# Patient Record
Sex: FEMALE | Race: BLACK OR AFRICAN AMERICAN | Employment: FULL TIME | URBAN - METROPOLITAN AREA
[De-identification: names, ages, dates, MRNs, and addresses within clinical notes are randomized per-mention and may not be internally consistent; named-entity substitution may affect disease eponyms.]

---

## 2022-07-12 ENCOUNTER — OFFICE VISIT (OUTPATIENT)
Dept: FAMILY MEDICINE CLINIC | Age: 67
End: 2022-07-12
Payer: COMMERCIAL

## 2022-07-12 VITALS
DIASTOLIC BLOOD PRESSURE: 71 MMHG | SYSTOLIC BLOOD PRESSURE: 106 MMHG | BODY MASS INDEX: 33.31 KG/M2 | HEIGHT: 64 IN | WEIGHT: 195.13 LBS | HEART RATE: 90 BPM

## 2022-07-12 DIAGNOSIS — M25.572 ACUTE BILATERAL ANKLE PAIN: ICD-10-CM

## 2022-07-12 DIAGNOSIS — M25.571 ACUTE BILATERAL ANKLE PAIN: ICD-10-CM

## 2022-07-12 DIAGNOSIS — M79.672 FOOT PAIN, BILATERAL: Primary | ICD-10-CM

## 2022-07-12 DIAGNOSIS — M79.671 FOOT PAIN, BILATERAL: Primary | ICD-10-CM

## 2022-07-12 PROCEDURE — 1123F ACP DISCUSS/DSCN MKR DOCD: CPT | Performed by: FAMILY MEDICINE

## 2022-07-12 PROCEDURE — 99203 OFFICE O/P NEW LOW 30 MIN: CPT | Performed by: FAMILY MEDICINE

## 2022-07-12 SDOH — ECONOMIC STABILITY: FOOD INSECURITY: WITHIN THE PAST 12 MONTHS, YOU WORRIED THAT YOUR FOOD WOULD RUN OUT BEFORE YOU GOT MONEY TO BUY MORE.: NEVER TRUE

## 2022-07-12 SDOH — ECONOMIC STABILITY: FOOD INSECURITY: WITHIN THE PAST 12 MONTHS, THE FOOD YOU BOUGHT JUST DIDN'T LAST AND YOU DIDN'T HAVE MONEY TO GET MORE.: NEVER TRUE

## 2022-07-12 ASSESSMENT — ENCOUNTER SYMPTOMS
BACK PAIN: 0
RESPIRATORY NEGATIVE: 1
GASTROINTESTINAL NEGATIVE: 1

## 2022-07-12 ASSESSMENT — PATIENT HEALTH QUESTIONNAIRE - PHQ9
1. LITTLE INTEREST OR PLEASURE IN DOING THINGS: 0
SUM OF ALL RESPONSES TO PHQ QUESTIONS 1-9: 0
SUM OF ALL RESPONSES TO PHQ9 QUESTIONS 1 & 2: 0
SUM OF ALL RESPONSES TO PHQ QUESTIONS 1-9: 0
SUM OF ALL RESPONSES TO PHQ QUESTIONS 1-9: 0
2. FEELING DOWN, DEPRESSED OR HOPELESS: 0
SUM OF ALL RESPONSES TO PHQ QUESTIONS 1-9: 0

## 2022-07-12 ASSESSMENT — SOCIAL DETERMINANTS OF HEALTH (SDOH): HOW HARD IS IT FOR YOU TO PAY FOR THE VERY BASICS LIKE FOOD, HOUSING, MEDICAL CARE, AND HEATING?: NOT HARD AT ALL

## 2022-07-12 NOTE — LETTER
[address]  Phone: [phone]  Fax: [phone]    Linh Choudhury MD        July 12, 2022     Patient: Yasisne Hebetr   YOB: 1955   Date of Visit: 7/12/2022       To Whom It May Concern: It is my medical opinion that Yassine Hebert should remain out of work until 07/19/22. If you have any questions or concerns, please don't hesitate to call.     Sincerely,        Linh Choudhury MD
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
## 2022-07-14 ENCOUNTER — HOSPITAL ENCOUNTER (OUTPATIENT)
Dept: GENERAL RADIOLOGY | Age: 67
Discharge: HOME | End: 2022-07-14
Payer: COMMERCIAL

## 2022-07-14 ENCOUNTER — HOSPITAL ENCOUNTER (OUTPATIENT)
Age: 67
Discharge: HOME | End: 2022-07-14
Payer: COMMERCIAL

## 2022-07-14 DIAGNOSIS — M79.672 FOOT PAIN, BILATERAL: ICD-10-CM

## 2022-07-14 DIAGNOSIS — M25.572 ACUTE BILATERAL ANKLE PAIN: ICD-10-CM

## 2022-07-14 DIAGNOSIS — M25.571 ACUTE BILATERAL ANKLE PAIN: ICD-10-CM

## 2022-07-14 DIAGNOSIS — M79.671 FOOT PAIN, BILATERAL: ICD-10-CM

## 2022-07-14 PROCEDURE — 73620 X-RAY EXAM OF FOOT: CPT

## 2022-07-14 PROCEDURE — 73600 X-RAY EXAM OF ANKLE: CPT

## 2022-07-15 ENCOUNTER — TELEPHONE (OUTPATIENT)
Dept: FAMILY MEDICINE CLINIC | Age: 67
End: 2022-07-15

## 2022-07-19 ENCOUNTER — OFFICE VISIT (OUTPATIENT)
Dept: FAMILY MEDICINE CLINIC | Age: 67
End: 2022-07-19
Payer: COMMERCIAL

## 2022-07-19 VITALS
OXYGEN SATURATION: 97 % | DIASTOLIC BLOOD PRESSURE: 82 MMHG | BODY MASS INDEX: 33.46 KG/M2 | HEART RATE: 88 BPM | WEIGHT: 196 LBS | SYSTOLIC BLOOD PRESSURE: 126 MMHG | HEIGHT: 64 IN

## 2022-07-19 DIAGNOSIS — M79.631 RIGHT FOREARM PAIN: ICD-10-CM

## 2022-07-19 DIAGNOSIS — M79.671 FOOT PAIN, BILATERAL: ICD-10-CM

## 2022-07-19 DIAGNOSIS — M79.672 FOOT PAIN, BILATERAL: ICD-10-CM

## 2022-07-19 DIAGNOSIS — M89.8X2 PAIN OF RIGHT HUMERUS: Primary | ICD-10-CM

## 2022-07-19 DIAGNOSIS — M25.522 LEFT ELBOW PAIN: ICD-10-CM

## 2022-07-19 PROCEDURE — 1123F ACP DISCUSS/DSCN MKR DOCD: CPT | Performed by: NURSE PRACTITIONER

## 2022-07-19 PROCEDURE — 99213 OFFICE O/P EST LOW 20 MIN: CPT | Performed by: NURSE PRACTITIONER

## 2022-07-19 ASSESSMENT — ENCOUNTER SYMPTOMS
GASTROINTESTINAL NEGATIVE: 1
RESPIRATORY NEGATIVE: 1
BACK PAIN: 0

## 2022-07-19 NOTE — LETTER
[address]  Phone: [phone]  Fax: [phone]    BILL Reeves CNP        July 19, 2022     Patient: Kaci Todd   YOB: 1955   Date of Visit: 7/19/2022       To Whom It May Concern: It is my medical opinion that Kaci Todd should remain out of work until 8/1/2022. If you have any questions or concerns, please don't hesitate to call.     Sincerely,        BILL Reeves CNP
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
## 2022-07-19 NOTE — PATIENT INSTRUCTIONS
[address] [facility]  [address], [facility], [address]  Call to schedule: [phone]

## 2022-07-25 ENCOUNTER — HOSPITAL ENCOUNTER (OUTPATIENT)
Dept: PHYSICAL THERAPY | Age: 67
Setting detail: THERAPIES SERIES
Discharge: HOME | End: 2022-07-25
Payer: OTHER MISCELLANEOUS

## 2022-07-25 ENCOUNTER — HOSPITAL ENCOUNTER (OUTPATIENT)
Age: 67
Discharge: HOME | End: 2022-07-25
Payer: OTHER MISCELLANEOUS

## 2022-07-25 ENCOUNTER — HOSPITAL ENCOUNTER (OUTPATIENT)
Dept: GENERAL RADIOLOGY | Age: 67
Discharge: HOME | End: 2022-07-25
Payer: OTHER MISCELLANEOUS

## 2022-07-25 DIAGNOSIS — M89.8X2 PAIN OF RIGHT HUMERUS: ICD-10-CM

## 2022-07-25 DIAGNOSIS — M79.631 RIGHT FOREARM PAIN: ICD-10-CM

## 2022-07-25 DIAGNOSIS — M25.522 LEFT ELBOW PAIN: ICD-10-CM

## 2022-07-25 PROCEDURE — 97110 THERAPEUTIC EXERCISES: CPT

## 2022-07-25 PROCEDURE — 73070 X-RAY EXAM OF ELBOW: CPT

## 2022-07-25 PROCEDURE — 97161 PT EVAL LOW COMPLEX 20 MIN: CPT

## 2022-07-25 PROCEDURE — 73090 X-RAY EXAM OF FOREARM: CPT

## 2022-07-25 PROCEDURE — 73060 X-RAY EXAM OF HUMERUS: CPT

## 2022-07-25 NOTE — FLOWSHEET NOTE
[address] [facility]  Phone: [phone]   Fax: [phone]    Physical Therapy Daily Treatment Note  Date:  2022    Patient Name:  Rossana Hawkins    :  1955  MRN: [id number]  Medical/Treatment Diagnosis Information:  Diagnosis: M79.671, M79.672 (ICD-10-CM) - Foot pain, bilateral  M25.571, M25.572 (ICD-10-CM) - Acute bilateral ankle pain  Treatment Diagnosis: bilateral ankle and UE pain, decreased trunk control, decreased UE strength and ROM  Insurance/Certification information:  PT Insurance Information: farmer's insurance  Physician Information:   Gavi Asher [address] [place] [address] signed (Y/N): []  Yes [x]  No     Date of Patient follow up with Physician:      Progress Report: []  Yes  [x]  No     Date Range for reporting period:  Beginnin/25  Ending:     Progress report due (10 Rx/or 30 days whichever is less): visit #10 or 3/42 (date)     Recertification due (POC duration/ or 90 days whichever is less): visit #12 or 10/25 (date)     Visit # Insurance Allowable Auth required?  Date Range   1+  []  Yes  [x]  No      Latex Allergy:  [x]NO      []YES  Preferred Language for Healthcare:   [x]English       []other:    Functional Scale:           Date assessed:  FOTO physical FS primary measure score = 67; risk adjusted = 56      Pain level: 3/10 (RLE, RUE),  1/10 (LLE, LUE)     SUBJECTIVE:  See eval    OBJECTIVE: See eval      RESTRICTIONS/PRECAUTIONS: NA    Exercises/Interventions:     Therapeutic Exercises (53333) Resistance / level Sets/sec Reps Notes                                                                  Therapeutic Activities (37792)                                          Neuromuscular Re-ed (08129)                                                 Manual Intervention (77374)                                                   AquaticTherapy Dates of Service:   Aquatic Visits Exercises/Activities:   Transfers:  [] Stairs   [] Exercise Program:  HEP instruction: Written HEP instructions provided and reviewed  Access Code: [email or web address]  URL: [email or web address]  Date: 07/25/2022  Prepared by: Paty Chapman     Exercises  Gastroc Stretch on Wall - 1 x daily - 7 x weekly - 1 sets - 10 reps - 30 sec hold  Standing Gastroc Stretch on Step - 1 x daily - 7 x weekly - 1 sets - 10 reps - 30 sec hold  Seated Hamstring Stretch - 1 x daily - 7 x weekly - 1 sets - 10 reps - 30 sec hold  Quadricep Stretch with Chair and Counter Support - 1 x daily - 7 x weekly - 1 sets - 10 reps - 30 sec hold  Seated Wrist Flexion Stretch - 1 x daily - 7 x weekly - 1 sets - 10 reps - 30 sec hold  Seated Wrist Extension Stretch - 1 x daily - 7 x weekly - 1 sets - 10 reps - 30 sec hold    Aquatics   [] (66504) Aquatic therapy with therapeutic exercise. Provided verbal and tactile cueing for activities related to strengthening, flexibility, endurance, ROM for improvements in  [] LE / lumbar: LE, proximal hip, and core control in self care, mobility, lifting, ambulation and eccentric single leg control. [] UE / cervical: cervical, scapular, scapulothoracic and UE control with self care, reaching, carrying, lifting, house/yardwork, driving, computer work.   [] (07352) Therapist is in constant attendance of 2 or more patients providing skilled therapy interventions, but not providing any significant amount of measurable one-on-one time to either patient, for improvements in  [] LE / lumbar: LE, proximal hip, and core control in self care, mobility, lifting, ambulation and eccentric single leg control. [] UE / cervical: cervical, scapular, scapulothoracic and UE control with self care, reaching, carrying, lifting, house/yardwork, driving, computer work.       Therapeutic Exercise and NMR EXR  [] (70291) Provided verbal/tactile cueing for activities related to strengthening, flexibility, endurance, ROM for improvements in  [] LE / Lumbar: LE, proximal hip, and core control with self care, mobility, lifting, ambulation. [] UE / Cervical: cervical, postural, scapular, scapulothoracic and UE control with self care, reaching, carrying, lifting, house/yardwork, driving, computer work.  [] (13601) Provided verbal/tactile cueing for activities related to improving balance, coordination, kinesthetic sense, posture, motor skill, proprioception to assist with   [] LE / lumbar: LE, proximal hip, and core control in self care, mobility, lifting, ambulation and eccentric single leg control. [] UE / cervical: cervical, scapular, scapulothoracic and UE control with self care, reaching, carrying, lifting, house/yardwork, driving, computer work.   [] (25224) Therapist is in constant attendance of 2 or more patients providing skilled therapy interventions, but not providing any significant amount of measurable one-on-one time to either patient, for improvements in  [] LE / lumbar: LE, proximal hip, and core control in self care, mobility, lifting, ambulation and eccentric single leg control. [] UE / cervical: cervical, scapular, scapulothoracic and UE control with self care, reaching, carrying, lifting, house/yardwork, driving, computer work.      NMR and Therapeutic Activities:    [] (65688 or 80263) Provided verbal/tactile cueing for activities related to improving balance, coordination, kinesthetic sense, posture, motor skill, proprioception and motor activation to allow for proper function of   [] LE: / Lumbar core, proximal hip and LE with self care and ADLs  [] UE / Cervical: cervical, postural, scapular, scapulothoracic and UE control with self care, carrying, lifting, driving, computer work.   [] (85528) Gait Re-education- Provided training and instruction to the patient for proper LE, core and proximal hip recruitment and positioning and eccentric body weight control with ambulation re-education including up and down stairs     Home Management Training / Self Care:  [] (51784) Provided self-care/home management training related to activities of daily living and compensatory training, and/or use of adaptive equipment for improvement with: ADLs and compensatory training, meal preparation, safety procedures and instruction in use of adaptive equipment, including bathing, grooming, dressing, personal hygiene, basic household cleaning and chores. Home Exercise Program:    [x] (33344) Reviewed/Progressed HEP activities related to strengthening, flexibility, endurance, ROM of   [x] LE / Lumbar: core, proximal hip and LE for functional self-care, mobility, lifting and ambulation/stair navigation   [x] UE / Cervical: cervical, postural, scapular, scapulothoracic and UE control with self care, reaching, carrying, lifting, house/yardwork, driving, computer work  [] (93139)Reviewed/Progressed HEP activities related to improving balance, coordination, kinesthetic sense, posture, motor skill, proprioception of   [] LE: core, proximal hip and LE for self care, mobility, lifting, and ambulation/stair navigation    [] UE / Cervical: cervical, postural,  scapular, scapulothoracic and UE control with self care, reaching, carrying, lifting, house/yardwork, driving, computer work    Manual Treatments:  PROM / STM / Oscillations-Mobs:  G-I, II, III, IV (PA's, Inf., Post.)  [] (16140) Provided manual therapy to mobilize LE, proximal hip and/or LS spine soft tissue/joints for the purpose of modulating pain, promoting relaxation,  increasing ROM, reducing/eliminating soft tissue swelling/inflammation/restriction, improving soft tissue extensibility and allowing for proper ROM for normal function with   [] LE / lumbar: self care, mobility, lifting and ambulation. [] UE / Cervical: self care, reaching, carrying, lifting, house/yardwork, driving, computer work.      Modalities:  [] (64232) Vasopneumatic compression: Utilized vasopneumatic compression to decrease edema / swelling for the purpose of improving mobility and quad tone / recruitment which will allow for increased overall function including but not limited to self-care, transfers, ambulation, and ascending / descending stairs. Charges:  Timed Code Treatment Minutes: 20   Total Treatment Minutes: 40     [x] EVAL - LOW (84682)   [] EVAL - MOD (22522)  [] EVAL - HIGH (98736)  [] RE-EVAL (39601)  [x] ADITYA(53537) x       [] Ionto  [] NMR (68947) x       [] Vaso  [] Manual (85891) x       [] Ultrasound  [] TA x        [] Mech Traction (80379)  [] Aquatic Therapy x     [] ES (un) (99824):   [] Home Management Training x  [] ES(attended) (78223)   [] Dry Needling 1-2 muscles (32672):  [] Dry Needling 3+ muscles (351166)  [] Group:      [] Other:     GOALS:   Patient stated goal: To be able to walk and run again      Therapist goals for Patient:  Short Term Goals: To be achieved in: 2 weeks  1. Independent in HEP and progression per patient tolerance, in order to prevent re-injury. [] Progressing: [] Met: [] Not Met: [] Adjusted       2. Patient will have a decrease in pain to facilitate improvement in movement, function, and ADLs to allow decreased pain with walking. [] Progressing: [] Met: [] Not Met: [] Adjusted          Long Term Goals: To be achieved in: 6 weeks  1. FOTO score of at least 78 to assist with reaching prior level of function. [] Progressing: [] Met: [] Not Met: [] Adjusted       2. Patient will demonstrate increased AROM to [facility] to allow for proper joint functioning to allow decreased pain with housework. [] Progressing: [] Met: [] Not Met: [] Adjusted       3. Patient will demonstrate an increase in postural awareness and control to allow for proper functional mobility with housework, walking, running. [] Progressing: [] Met: [] Not Met: [] Adjusted       4. Patient will demonstrate an increase in Strength to at least 5/5 to allow for proper functional mobility as indicated by patients decreased core control, pain with housework.    [] Progressing: [] Met: [] Not Met: [] Adjusted       5. Patient will return to functional activities including walking without increased symptoms or restriction. [] Progressing: [] Met: [] Not Met: [] Adjusted       6. Patient will return to running without increased symptoms or restriction. [] Progressing: [] Met: [] Not Met: [] Adjusted         Overall Progression Towards Functional goals/ Treatment Progress Update:  [] Patient is progressing as expected towards functional goals listed. [] Progression is slowed due to complexities/Impairments listed. [] Progression has been slowed due to co-morbidities. [x] Plan just implemented, too soon to assess goals progression <30days   [] Goals require adjustment due to lack of progress  [] Patient is not progressing as expected and requires additional follow up with physician  [] Other    Persisting Functional Limitations/Impairments:  []Sleeping []Sitting               [x]Standing [x]Transfers        [x]Walking []Kneeling               [x]Stairs [x]Squatting / bending   []ADLs []Reaching  [x]Lifting  [x]Housework  []Driving []Job related tasks  [x]Sports/Recreation []Other:        ASSESSMENT:  See eval  Treatment/Activity Tolerance:  [x] Patient able to complete tx [] Patient limited by fatigue  [] Patient limited by pain  [] Patient limited by other medical complications  [] Other:     Prognosis: [x] Good [] Fair  [] Poor    Patient Requires Follow-up: [x] Yes  [] No    Plan for next treatment session: Aquatics     PLAN: See eval. PT 2x / week for 6 weeks. [] Continue per plan of care [] Alter current plan (see comments)  [x] Plan of care initiated [] Hold pending MD visit [] Discharge    Electronically signed by: Herberth Ny, PT PT, DPT  Frank Simmonds, SPT   Therapist was present, directed the patient's care, made skilled judgement, and was responsible for assessment and treatment of the patient.          Note: If patient does not return for scheduled/ recommended follow up visits, this note will serve as a discharge from care along with most recent update on progress.
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
## 2022-07-27 ENCOUNTER — TELEPHONE (OUTPATIENT)
Dept: FAMILY MEDICINE CLINIC | Age: 67
End: 2022-07-27

## 2022-07-27 NOTE — TELEPHONE ENCOUNTER
We can try to have them filled out for her if she has dropped them off at the office and she has all of her parts filled out.

## 2022-07-27 NOTE — TELEPHONE ENCOUNTER
Patient came in and dropped off a letter that needs to be revised. Attached is a note that the letter needs to be dated 6 weeks from this week. Please advise. The previous letter has been attached with her schedule.  Please advise

## 2022-07-27 NOTE — TELEPHONE ENCOUNTER
Patient called wanting to drop off paperwork for physical therapy that she wanted to have extended by regan.      Will come by in the office to drop it off

## 2022-07-28 NOTE — TELEPHONE ENCOUNTER
I do not see any letter or attachments. Show me what she wants however I cannot keep her off work for 6 weeks from this week. Since she last saw Dipesh Selby, this may have to wait until she returns on Monday. See me before calling the patient.

## 2022-07-29 NOTE — TELEPHONE ENCOUNTER
The patient has arthritic changes, nothing acute. My letter can not be changed, I would recommend she return to work on 8/1/22. If she needs to be seen, she can schedule for next available, but please let her know that I will not recommend for any further time off.

## 2022-07-29 NOTE — TELEPHONE ENCOUNTER
Mirtha Antonio called back and was given the message. Mirtha Antonio would like to see Rhiannon Patiño for an appt Tues. 8/2 in the morning. - advise.

## 2022-08-02 ENCOUNTER — HOSPITAL ENCOUNTER (OUTPATIENT)
Dept: PHYSICAL THERAPY | Age: 67
Setting detail: THERAPIES SERIES
Discharge: HOME | End: 2022-08-02
Payer: OTHER MISCELLANEOUS

## 2022-08-02 PROCEDURE — 97113 AQUATIC THERAPY/EXERCISES: CPT

## 2022-08-02 NOTE — FLOWSHEET NOTE
[facility]  Phone: [phone]   Fax: [phone]    Physical Therapy Daily Treatment Note  Date:  2022    Patient Name:  Franklin Laurent    :  1955  MRN: [id number]  Medical/Treatment Diagnosis Information:  Diagnosis: M79.671, M79.672 (ICD-10-CM) - Foot pain, bilateral  M25.571, M25.572 (ICD-10-CM) - Acute bilateral ankle pain  Treatment Diagnosis: bilateral ankle and UE pain, decreased trunk control, decreased UE strength and ROM  Insurance/Certification information:   PT Insurance Information: farmer's insurance  Physician Information:   Anny Garcia, [address] [place] [address] signed (Y/N): []  Yes [x]  No -sent on     Date of Patient follow up with Physician:      Progress Report: []  Yes  [x]  No     Date Range for reporting period:  Beginnin/25  Ending:     Progress report due (10 Rx/or 30 days whichever is less): visit #10 or  (date)     Recertification due (POC duration/ or 90 days whichever is less): visit # or 10/25 (date)     Visit # Insurance Allowable Auth required? Date Range   +  []  Yes  [x]  No      Latex Allergy:  [x]NO      []YES  Preferred Language for Healthcare:   [x]English       []other:    Functional Scale:           Date assessed:  FOTO physical FS primary measure score = 67; risk adjusted = 56      Pain level: 3/10 (RLE, RUE),  1/10 (LLE, LUE)     SUBJECTIVE:  Pt extremely fearful of water. Hoping to not walk far in water.      OBJECTIVE: See eval      RESTRICTIONS/PRECAUTIONS: NA    Exercises/Interventions:     Therapeutic Exercises (66320) Resistance / level Sets/sec Reps Notes                                                                  Therapeutic Activities (21558)                                          Neuromuscular Re-ed (93972)                                                 Manual Intervention (24794)                                                   AquaticTherapy Dates of Service: Aquatic Visits Exercises/Activities: *wears life jacket and requires assistance due to extreme fear of water*   Transfers:  [x] Stairs   [x] Ramp  [] Chair Lift   % Immersion:            Ambulation/ Warm up:   UE Exercises: Forward  x1 Shoulder Shrugs  x    Lateral   X 5 mini laps along wall  Shoulder Circles  x    Retro   X 5 mini laps along wall Scapular Retraction   x   [place]    Push Downs      Heel/Toe Walking    Punching       Rowing       Elbow Flex/Ext       Shldr Flex/Ext x      [employer] aBd/aDd x   LE Exercises:  Shldr Horiz aBd/aDd x   HR/TR x10 Shldr IR/ER    Marches x10 Arm Circles x   Squats x10 PNF Diagonals    Hamstring Curls x10 Wall Push Ups    Hip Flexion (SLR) x10     Hip aBduction (SLR) x10     Hip Extension (SLR) x10      Hip aDduction (SLR)      Hip Circles x10 Functional:    Hip IR/Er  Step up forward x   Hip Hikes  Step up lateral  x     Step down        Lunges Forward      Lunges Retro      Lunges Lateral     Balance:        SLS  x      Tandem Stance       NBOS eyes open  Seated:     NBOS eyes closed  Ankle pumps     Hand to Opposite Knee x Ankle Circles     Fwd Step ups to SLS  Knee Flex/Ext    Lateral Step ups to SLS  Hip aBd/aDd    Stop/Go Gait   Bicycle       Ankle DF/PF      Ankle Inv/Ev    Stretching:       Gastroc/Soleus x     Hamstring  x Deep Water:    Knee Flex Stretch  Jog    Piriformis   Noodle Hang    Hip Flexor  Traction at Wall X 5 min   SKTC      DKTC       ITB  Cool Down:    Quad  Fwd Walking x   Mid Back   Lat Walking    UT  Retro Walking    Post Shoulder  Noodle float    Ladder Pull x     Pec Stretch            Core: Other:    TrA set      Pelvic Tilts      Multifidi Walk outs c paddle      PNF Chop/Lifts                          Aquatic Abbreviation Key  B= Belt DB= Dumbells T= Theratube   H= Hydrotone N= Noodles W= Weights   P= Paddles S= Speedo equipment K= Kickboard      Pt.  Education: Gave pt tour of pool, explained how to use lockers, what to wear, that it would be in group setting, and showed pt aquatic equipment. Modalities:     Pt. Education:  -patient educated on diagnosis, prognosis and expectations for rehab  -all patient questions were answered    Home Exercise Program:  HEP instruction: Written HEP instructions provided and reviewed  Access Code: [email or web address]  URL: [email or web address]  Date: 07/25/2022  Prepared by: [facility]     Exercises  Gastroc Stretch on Wall - 1 x daily - 7 x weekly - 1 sets - 10 reps - 30 sec hold  Standing Gastroc Stretch on Step - 1 x daily - 7 x weekly - 1 sets - 10 reps - 30 sec hold  Seated Hamstring Stretch - 1 x daily - 7 x weekly - 1 sets - 10 reps - 30 sec hold  Quadricep Stretch with Chair and Counter Support - 1 x daily - 7 x weekly - 1 sets - 10 reps - 30 sec hold  Seated Wrist Flexion Stretch - 1 x daily - 7 x weekly - 1 sets - 10 reps - 30 sec hold  Seated Wrist Extension Stretch - 1 x daily - 7 x weekly - 1 sets - 10 reps - 30 sec hold    Aquatics   [x] (05057) Aquatic therapy with therapeutic exercise. Provided verbal and tactile cueing for activities related to strengthening, flexibility, endurance, ROM for improvements in  [x] LE / lumbar: LE, proximal hip, and core control in self care, mobility, lifting, ambulation and eccentric single leg control. [] UE / cervical: cervical, scapular, scapulothoracic and UE control with self care, reaching, carrying, lifting, house/yardwork, driving, computer work.   [] (61196) Therapist is in constant attendance of 2 or more patients providing skilled therapy interventions, but not providing any significant amount of measurable one-on-one time to either patient, for improvements in  [] LE / lumbar: LE, proximal hip, and core control in self care, mobility, lifting, ambulation and eccentric single leg control. [] UE / cervical: cervical, scapular, scapulothoracic and UE control with self care, reaching, carrying, lifting, house/yardwork, driving, computer work. Therapeutic Exercise and NMR EXR  [] (43379) Provided verbal/tactile cueing for activities related to strengthening, flexibility, endurance, ROM for improvements in  [] LE / Lumbar: LE, proximal hip, and core control with self care, mobility, lifting, ambulation. [] UE / Cervical: cervical, postural, scapular, scapulothoracic and UE control with self care, reaching, carrying, lifting, house/yardwork, driving, computer work.  [] (64986) Provided verbal/tactile cueing for activities related to improving balance, coordination, kinesthetic sense, posture, motor skill, proprioception to assist with   [] LE / lumbar: LE, proximal hip, and core control in self care, mobility, lifting, ambulation and eccentric single leg control. [] UE / cervical: cervical, scapular, scapulothoracic and UE control with self care, reaching, carrying, lifting, house/yardwork, driving, computer work.   [] (00674) Therapist is in constant attendance of 2 or more patients providing skilled therapy interventions, but not providing any significant amount of measurable one-on-one time to either patient, for improvements in  [] LE / lumbar: LE, proximal hip, and core control in self care, mobility, lifting, ambulation and eccentric single leg control. [] UE / cervical: cervical, scapular, scapulothoracic and UE control with self care, reaching, carrying, lifting, house/yardwork, driving, computer work.      NMR and Therapeutic Activities:    [] (91345 or 55339) Provided verbal/tactile cueing for activities related to improving balance, coordination, kinesthetic sense, posture, motor skill, proprioception and motor activation to allow for proper function of   [] LE: / Lumbar core, proximal hip and LE with self care and ADLs  [] UE / Cervical: cervical, postural, scapular, scapulothoracic and UE control with self care, carrying, lifting, driving, computer work.   [] (60068) Gait Re-education- Provided training and instruction to the patient for proper LE, core and proximal hip recruitment and positioning and eccentric body weight control with ambulation re-education including up and down stairs     Home Management Training / Self Care:  [] (72063) Provided self-care/home management training related to activities of daily living and compensatory training, and/or use of adaptive equipment for improvement with: ADLs and compensatory training, meal preparation, safety procedures and instruction in use of adaptive equipment, including bathing, grooming, dressing, personal hygiene, basic household cleaning and chores. Home Exercise Program:    [] (66739) Reviewed/Progressed HEP activities related to strengthening, flexibility, endurance, ROM of   [] LE / Lumbar: core, proximal hip and LE for functional self-care, mobility, lifting and ambulation/stair navigation   [] UE / Cervical: cervical, postural, scapular, scapulothoracic and UE control with self care, reaching, carrying, lifting, house/yardwork, driving, computer work  [] (46332)Reviewed/Progressed HEP activities related to improving balance, coordination, kinesthetic sense, posture, motor skill, proprioception of   [] LE: core, proximal hip and LE for self care, mobility, lifting, and ambulation/stair navigation    [] UE / Cervical: cervical, postural,  scapular, scapulothoracic and UE control with self care, reaching, carrying, lifting, house/yardwork, driving, computer work    Manual Treatments:  PROM / STM / Oscillations-Mobs:  G-I, II, III, IV (PA's, Inf., Post.)  [] (20043) Provided manual therapy to mobilize LE, proximal hip and/or LS spine soft tissue/joints for the purpose of modulating pain, promoting relaxation,  increasing ROM, reducing/eliminating soft tissue swelling/inflammation/restriction, improving soft tissue extensibility and allowing for proper ROM for normal function with   [] LE / lumbar: self care, mobility, lifting and ambulation.     [] UE / Cervical: self care, reaching, carrying, lifting, house/yardwork, driving, computer work. Modalities:  [] (82116) Vasopneumatic compression: Utilized vasopneumatic compression to decrease edema / swelling for the purpose of improving mobility and quad tone / recruitment which will allow for increased overall function including but not limited to self-care, transfers, ambulation, and ascending / descending stairs. Charges:  Timed Code Treatment Minutes: 30   Total Treatment Minutes: 30     [] EVAL - LOW (09800)   [] EVAL - MOD (68499)  [] EVAL - HIGH (31353)  [] RE-EVAL (21779)  [] BT(87271) x       [] Ionto  [] NMR (53737) x       [] Vaso  [] Manual (44818) x       [] Ultrasound  [] TA x        [] Mech Traction (51009)  [x] Aquatic Therapy x  2   [] ES (un) (52522):   [] Home Management Training x  [] ES(attended) (49446)   [] Dry Needling 1-2 muscles (96240):  [] Dry Needling 3+ muscles (159013)  [] Group:      [] Other:     GOALS:   Patient stated goal: To be able to walk and run again      Therapist goals for Patient:  Short Term Goals: To be achieved in: 2 weeks  1. Independent in HEP and progression per patient tolerance, in order to prevent re-injury. [] Progressing: [] Met: [] Not Met: [] Adjusted       2. Patient will have a decrease in pain to facilitate improvement in movement, function, and ADLs to allow decreased pain with walking. [] Progressing: [] Met: [] Not Met: [] Adjusted          Long Term Goals: To be achieved in: 6 weeks  1. FOTO score of at least 78 to assist with reaching prior level of function. [] Progressing: [] Met: [] Not Met: [] Adjusted       2. Patient will demonstrate increased AROM to [facility] to allow for proper joint functioning to allow decreased pain with housework. [] Progressing: [] Met: [] Not Met: [] Adjusted       3. Patient will demonstrate an increase in postural awareness and control to allow for proper functional mobility with housework, walking, running.    [] Progressing: [] Met: [] Not Met: [] Adjusted       4. Patient will demonstrate an increase in Strength to at least 5/5 to allow for proper functional mobility as indicated by patients decreased core control, pain with housework. [] Progressing: [] Met: [] Not Met: [] Adjusted       5. Patient will return to functional activities including walking without increased symptoms or restriction. [] Progressing: [] Met: [] Not Met: [] Adjusted       6. Patient will return to running without increased symptoms or restriction. [] Progressing: [] Met: [] Not Met: [] Adjusted         Overall Progression Towards Functional goals/ Treatment Progress Update:  [] Patient is progressing as expected towards functional goals listed. [] Progression is slowed due to complexities/Impairments listed. [] Progression has been slowed due to co-morbidities. [x] Plan just implemented, too soon to assess goals progression <30days   [] Goals require adjustment due to lack of progress  [] Patient is not progressing as expected and requires additional follow up with physician  [] Other    Persisting Functional Limitations/Impairments:  []Sleeping []Sitting               [x]Standing [x]Transfers        [x]Walking []Kneeling               [x]Stairs [x]Squatting / bending   []ADLs []Reaching  [x]Lifting  [x]Housework  []Driving []Job related tasks  [x]Sports/Recreation []Other:        ASSESSMENT:  Pt extremely fearful of water upon initial aquatic session today. Pt educated on walking along wall and wearing life jacket and holding noodle to feel more stable. Pt limited by fear of water, not balance. Tolerated all exercises well in water along wall with SBA. Ended with traction in corner of pool to relieve LBP. Continue to progress as tolerated, if Pt too fearful of water consider switching to land therapy.        Treatment/Activity Tolerance:  [x] Patient able to complete tx [] Patient limited by fatigue  [] Patient limited by pain  [] Patient limited by other medical

## 2022-08-04 ENCOUNTER — HOSPITAL ENCOUNTER (OUTPATIENT)
Dept: PHYSICAL THERAPY | Age: 67
Setting detail: THERAPIES SERIES
Discharge: HOME | End: 2022-08-04
Payer: OTHER MISCELLANEOUS

## 2022-08-04 NOTE — FLOWSHEET NOTE
Physical Therapy  Cancellation/No-show Note  Patient Name:  Yo Reyna  :  1955   Date:  2022  Cancelled visits to date: 1  No-shows to date: 0    Patient status for today's appointment patient:  [x]  Cancelled   []  Rescheduled appointment  []  No-show     Reason given by patient:  []  Patient ill  []  Conflicting appointment  []  No transportation    []  Conflict with work  []  No reason given  [x]  Other:     Comments:  Home emergency    Phone call information:   []  Phone call made today to patient at _ time at number provided:      []  Patient answered, conversation as follows:    []  Patient did not answer, message left as follows:  []  Phone call not made today  [x]  Phone call not needed - pt contacted us to cancel and provided reason for cancellation.      Electronically signed by:  Pao Verdin PT

## 2022-08-09 ENCOUNTER — HOSPITAL ENCOUNTER (OUTPATIENT)
Dept: PHYSICAL THERAPY | Age: 67
Setting detail: THERAPIES SERIES
Discharge: HOME | End: 2022-08-09
Payer: OTHER MISCELLANEOUS

## 2022-08-09 PROCEDURE — 97113 AQUATIC THERAPY/EXERCISES: CPT

## 2022-08-09 NOTE — FLOWSHEET NOTE
[address] [facility] Physical Therapy  Phone: [phone]   Fax: [phone]    Physical Therapy Daily Treatment Note  Date:  2022    Patient Name:  Hari Whipple    :  1955  MRN: [id number]  Medical/Treatment Diagnosis Information:  Diagnosis: M79.671, M79.672 (ICD-10-CM) - Foot pain, bilateral  M25.571, M25.572 (ICD-10-CM) - Acute bilateral ankle pain  Treatment Diagnosis: bilateral ankle and UE pain, decreased trunk control, decreased UE strength and ROM  Insurance/Certification information:   PT Insurance Information: farmer's insurance  Physician Information:   Nicki Sheth [address] [place] [address] signed (Y/N): []  Yes [x]  No -sent on     Date of Patient follow up with Physician:      Progress Report: []  Yes  [x]  No     Date Range for reporting period:  Beginnin/25  Ending:     Progress report due (10 Rx/or 30 days whichever is less): visit #10 or  (date)     Recertification due (POC duration/ or 90 days whichever is less): visit # or 10/25 (date)     Visit # Insurance Allowable Auth required? Date Range   +   []  Yes  [x]  No      Latex Allergy:  [x]NO      []YES  Preferred Language for Healthcare:   [x]English       []other:    Functional Scale:           Date assessed:  TO physical FS primary measure score = 67; risk adjusted = 56      Pain level:  5/10 -generally     SUBJECTIVE:  Pt reports she felt better after the last session and is feeling more comfortable getting in the water today.      OBJECTIVE: See eval      RESTRICTIONS/PRECAUTIONS: NA    Exercises/Interventions:     Therapeutic Exercises (06300) Resistance / level Sets/sec Reps Notes                                                                  Therapeutic Activities (12364)                                          Neuromuscular Re-ed (72672)                                                 Manual Intervention (75536) AquaticTherapy Dates of Service: 8/9  Aquatic Visits Exercises/Activities: *wears life jacket and requires assistance due to extreme fear of water*   Transfers:  [] Stairs   [x] Ramp  [] Chair Lift   % Immersion:            Ambulation/ Warm up:   UE Exercises: Forward  X 2 laps along railing of ramp with 2 noodles in other hand Shoulder Shrugs  x    Lateral   X 2 laps along railing of ramp with 2 noodles in other hand Shoulder Circles  x    Retro   X Scapular Retraction   x   [place]    Push Downs      Heel/Toe Walking    Punching       Rowing       Elbow Flex/Ext X 10      Shldr Flex/Ext X 10      Shldr aBd/aDd X 10   LE Exercises:  Shldr Horiz aBd/aDd x   HR/TR x10 Shldr IR/ER    Marches x10 Arm Circles X 10 cw/ccw   Squats x10 PNF Diagonals    Hamstring Curls x10 Wall Push Ups    Hip Flexion (SLR) x10     Hip aBduction (SLR) x10     Hip Extension (SLR) x10      Hip aDduction (SLR)      Hip Circles x10 Functional:    Hip IR/Er  Step up forward x   Hip Hikes  Step up lateral  x     Step down        Lunges Forward      Lunges Retro      Lunges Lateral     Balance:        SLS  x      Tandem Stance       NBOS eyes open  Seated:     NBOS eyes closed  Ankle pumps     Hand to Opposite Knee x Ankle Circles     Fwd Step ups to SLS  Knee Flex/Ext    Lateral Step ups to SLS  Hip aBd/aDd    Stop/Go Gait   Bicycle       Ankle DF/PF      Ankle Inv/Ev    Stretching:       Gastroc/Soleus x     Hamstring  x Deep Water:    Knee Flex Stretch  Jog    Piriformis   Noodle Hang    Hip Flexor  Traction at Wall X SKTC      DKTC       ITB  Cool Down:    Quad  Fwd Walking x   Mid Back   Lat Walking    UT  Retro Walking    Post Shoulder  Noodle float    Ladder Pull x     Pec Stretch            Core:   Other:    TrA set      Pelvic Tilts      Multifidi Walk outs c paddle      PNF Chop/Lifts                          Aquatic Abbreviation Key  B= Belt DB= Dumbells T= Theratube   H= Hydrotone N= Noodles W= Weights   P= Paddles S= Speedo equipment K= Kickboard      Pt. Education: Gave pt tour of pool, explained how to use lockers, what to wear, that it would be in group setting, and showed pt aquatic equipment. Modalities:     Pt. Education:  -patient educated on diagnosis, prognosis and expectations for rehab  -all patient questions were answered    Home Exercise Program:  HEP instruction: Written HEP instructions provided and reviewed  Access Code: [email or web address]  URL: [employer]/  Date: 07/25/2022  Prepared by: Isabel Hickey     Exercises  Gastroc Stretch on Wall - 1 x daily - 7 x weekly - 1 sets - 10 reps - 30 sec hold  Standing Gastroc Stretch on Step - 1 x daily - 7 x weekly - 1 sets - 10 reps - 30 sec hold  Seated Hamstring Stretch - 1 x daily - 7 x weekly - 1 sets - 10 reps - 30 sec hold  Quadricep Stretch with Chair and Counter Support - 1 x daily - 7 x weekly - 1 sets - 10 reps - 30 sec hold  Seated Wrist Flexion Stretch - 1 x daily - 7 x weekly - 1 sets - 10 reps - 30 sec hold  Seated Wrist Extension Stretch - 1 x daily - 7 x weekly - 1 sets - 10 reps - 30 sec hold    Aquatics   [x] (32981) Aquatic therapy with therapeutic exercise. Provided verbal and tactile cueing for activities related to strengthening, flexibility, endurance, ROM for improvements in  [x] LE / lumbar: LE, proximal hip, and core control in self care, mobility, lifting, ambulation and eccentric single leg control. [] UE / cervical: cervical, scapular, scapulothoracic and UE control with self care, reaching, carrying, lifting, house/yardwork, driving, computer work.   [] (42847) Therapist is in constant attendance of 2 or more patients providing skilled therapy interventions, but not providing any significant amount of measurable one-on-one time to either patient, for improvements in  [] LE / lumbar: LE, proximal hip, and core control in self care, mobility, lifting, ambulation and eccentric single leg control.    [] UE / cervical: cervical, scapular, scapulothoracic and UE control with self care, reaching, carrying, lifting, house/yardwork, driving, computer work. Therapeutic Exercise and NMR EXR  [] (54265) Provided verbal/tactile cueing for activities related to strengthening, flexibility, endurance, ROM for improvements in  [] LE / Lumbar: LE, proximal hip, and core control with self care, mobility, lifting, ambulation. [] UE / Cervical: cervical, postural, scapular, scapulothoracic and UE control with self care, reaching, carrying, lifting, house/yardwork, driving, computer work.  [] (17938) Provided verbal/tactile cueing for activities related to improving balance, coordination, kinesthetic sense, posture, motor skill, proprioception to assist with   [] LE / lumbar: LE, proximal hip, and core control in self care, mobility, lifting, ambulation and eccentric single leg control. [] UE / cervical: cervical, scapular, scapulothoracic and UE control with self care, reaching, carrying, lifting, house/yardwork, driving, computer work.   [] (67304) Therapist is in constant attendance of 2 or more patients providing skilled therapy interventions, but not providing any significant amount of measurable one-on-one time to either patient, for improvements in  [] LE / lumbar: LE, proximal hip, and core control in self care, mobility, lifting, ambulation and eccentric single leg control. [] UE / cervical: cervical, scapular, scapulothoracic and UE control with self care, reaching, carrying, lifting, house/yardwork, driving, computer work.      NMR and Therapeutic Activities:    [] (52533 or 98055) Provided verbal/tactile cueing for activities related to improving balance, coordination, kinesthetic sense, posture, motor skill, proprioception and motor activation to allow for proper function of   [] LE: / Lumbar core, proximal hip and LE with self care and ADLs  [] UE / Cervical: cervical, postural, scapular, scapulothoracic and UE control with self care, carrying, lifting, driving, computer work.   [] (65595) Gait Re-education- Provided training and instruction to the patient for proper LE, core and proximal hip recruitment and positioning and eccentric body weight control with ambulation re-education including up and down stairs     Home Management Training / Self Care:  [] (75450) Provided self-care/home management training related to activities of daily living and compensatory training, and/or use of adaptive equipment for improvement with: ADLs and compensatory training, meal preparation, safety procedures and instruction in use of adaptive equipment, including bathing, grooming, dressing, personal hygiene, basic household cleaning and chores.      Home Exercise Program:    [] (04427) Reviewed/Progressed HEP activities related to strengthening, flexibility, endurance, ROM of   [] LE / Lumbar: core, proximal hip and LE for functional self-care, mobility, lifting and ambulation/stair navigation   [] UE / Cervical: cervical, postural, scapular, scapulothoracic and UE control with self care, reaching, carrying, lifting, house/yardwork, driving, computer work  [] (00278)Reviewed/Progressed HEP activities related to improving balance, coordination, kinesthetic sense, posture, motor skill, proprioception of   [] LE: core, proximal hip and LE for self care, mobility, lifting, and ambulation/stair navigation    [] UE / Cervical: cervical, postural,  scapular, scapulothoracic and UE control with self care, reaching, carrying, lifting, house/yardwork, driving, computer work    Manual Treatments:  PROM / STM / Oscillations-Mobs:  G-I, II, III, IV (PA's, Inf., Post.)  [] (46913) Provided manual therapy to mobilize LE, proximal hip and/or LS spine soft tissue/joints for the purpose of modulating pain, promoting relaxation,  increasing ROM, reducing/eliminating soft tissue swelling/inflammation/restriction, improving soft tissue extensibility and allowing for proper ROM for normal control to allow for proper functional mobility with housework, walking, running. [] Progressing: [] Met: [] Not Met: [] Adjusted       4. Patient will demonstrate an increase in Strength to at least 5/5 to allow for proper functional mobility as indicated by patients decreased core control, pain with housework. [] Progressing: [] Met: [] Not Met: [] Adjusted       5. Patient will return to functional activities including walking without increased symptoms or restriction. [] Progressing: [] Met: [] Not Met: [] Adjusted       6. Patient will return to running without increased symptoms or restriction. [] Progressing: [] Met: [] Not Met: [] Adjusted         Overall Progression Towards Functional goals/ Treatment Progress Update:  [] Patient is progressing as expected towards functional goals listed. [] Progression is slowed due to complexities/Impairments listed. [] Progression has been slowed due to co-morbidities. [x] Plan just implemented, too soon to assess goals progression <30days   [] Goals require adjustment due to lack of progress  [] Patient is not progressing as expected and requires additional follow up with physician  [] Other    Persisting Functional Limitations/Impairments:  []Sleeping []Sitting               [x]Standing [x]Transfers        [x]Walking []Kneeling               [x]Stairs [x]Squatting / bending   []ADLs []Reaching  [x]Lifting  [x]Housework  []Driving []Job related tasks  [x]Sports/Recreation []Other:        ASSESSMENT:  Pt feeling much more comfortable in the pool this date. She wore the life jacket and used two pool noodles when not holding onto the wall. Kept reps at 10 this date and added UE exercises to see how pt responds next session. If pt is feeling more comfortable, my trial walking entire length of pool.      Treatment/Activity Tolerance:  [x] Patient able to complete tx [] Patient limited by fatigue  [] Patient limited by pain  [] Patient limited by other medical complications  [] Other:     Prognosis: [x] Good [] Fair  [] Poor    Patient Requires Follow-up: [x] Yes  [] No    Plan for next treatment session: Aquatics     PLAN: See eval. PT 2x / week for 6 weeks. [x] Continue per plan of care [] Alter current plan (see comments)  [] Plan of care initiated [] Hold pending MD visit [] Discharge    Electronically signed by: Dipesh Chacon, PT DPT         Note: If patient does not return for scheduled/ recommended follow up visits, this note will serve as a discharge from care along with most recent update on progress.

## 2022-08-11 ENCOUNTER — HOSPITAL ENCOUNTER (OUTPATIENT)
Dept: PHYSICAL THERAPY | Age: 67
Setting detail: THERAPIES SERIES
Discharge: HOME | End: 2022-08-11
Payer: OTHER MISCELLANEOUS

## 2022-08-11 PROCEDURE — 97113 AQUATIC THERAPY/EXERCISES: CPT

## 2022-08-11 NOTE — FLOWSHEET NOTE
[address] [facility]  Phone: [phone]   Fax: [phone]    Physical Therapy Daily Treatment Note  Date:  2022    Patient Name:  Laura Rosales    :  1955  MRN: [id number]  Medical/Treatment Diagnosis Information:  Diagnosis: M79.671, M79.672 (ICD-10-CM) - Foot pain, bilateral  M25.571, M25.572 (ICD-10-CM) - Acute bilateral ankle pain  Treatment Diagnosis: bilateral ankle and UE pain, decreased trunk control, decreased UE strength and ROM  Insurance/Certification information:   PT Insurance Information: farmer's insurance  Physician Information:   Mac Bosch [address] [place] [address] signed (Y/N): []  Yes [x]  No -sent on     Date of Patient follow up with Physician:      Progress Report: []  Yes  [x]  No     Date Range for reporting period:  Beginnin/25  Ending:     Progress report due (10 Rx/or 30 days whichever is less): visit #10 or  (date)     Recertification due (POC duration/ or 90 days whichever is less): visit # or 10/25 (date)     Visit # Insurance Allowable Auth required? Date Range   + 3/12  []  Yes  [x]  No      Latex Allergy:  [x]NO      []YES  Preferred Language for Healthcare:   [x]English       []other:    Functional Scale:           Date assessed:  TO physical FS primary measure score = 67; risk adjusted = 56      Pain level:  3/10 -generally     SUBJECTIVE:  Says both of the tops of her feet bother her. Felt better in the water last time.      OBJECTIVE: See eval      RESTRICTIONS/PRECAUTIONS: NA    Exercises/Interventions:     Therapeutic Exercises (60345) Resistance / level Sets/sec Reps Notes                                                                  Therapeutic Activities (44703)                                          Neuromuscular Re-ed (81247)                                                 Manual Intervention (68394)                                                   AquaticTherapy Dates of Service: 8/9, 8/11  Aquatic Visits Exercises/Activities: *wears life jacket and requires assistance due to extreme fear of water*   Transfers:  [] Stairs   [x] Ramp  [] Chair Lift   % Immersion:            Ambulation/ Warm up:   UE Exercises: Forward  X 2 laps full length along railing of ramp with 2 noodles in other hand Shoulder Shrugs  x    Lateral   X 2 laps full length along railing of ramp with 2 noodles in other hand Shoulder Circles  x    Retro   X Scapular Retraction   x   [place]    Push Downs      Heel/Toe Walking    Punching       Rowing       Elbow Flex/Ext X 10      Shldr Flex/Ext X 10      Shldr aBd/aDd X 10   LE Exercises:  Shldr Horiz aBd/aDd x   HR/TR x10 Shldr IR/ER    Marches x10 Arm Circles X 10 cw/ccw   Squats x10 PNF Diagonals    Hamstring Curls x10 Wall Push Ups    Hip Flexion (SLR) x10     Hip aBduction (SLR) x10     Hip Extension (SLR) x10      Hip aDduction (SLR)      Hip Circles x10 Functional:    Hip IR/Er  Step up forward x   Hip Hikes  Step up lateral  x     Step down        Lunges Forward      Lunges Retro      Lunges Lateral     Balance:        SLS  20\" x 2       Tandem Stance 20\" x 2       NBOS eyes open  Seated:     NBOS eyes closed  Ankle pumps  X 20    Hand to Opposite Knee x Ankle Circles  X 20    Fwd Step ups to SLS  Knee Flex/Ext    Lateral Step ups to SLS  Hip aBd/aDd    Stop/Go Gait   Bicycle       Ankle DF/PF X 20      Ankle Inv/Ev X 20    Stretching: Ankle ABC's X 1   Gastroc/Soleus x     Hamstring  x Deep Water:    Knee Flex Stretch  Jog    Piriformis   Noodle Hang    Hip Flexor  Traction at Wall X 5 mins holding on to both rails at [facility]      DKTC       ITB  Cool Down:    Quad  Fwd Walking x   Mid Back   Lat Walking    UT  Retro Walking    Post Shoulder  Noodle float    Ladder Pull x     Pec Stretch            Core:   Other:    TrA set      Pelvic Tilts      Multifidi Walk outs c paddle      PNF Chop/Lifts                          Aquatic Abbreviation Key  B= Belt DB= Dumbells T= Theratube   H= Hydrotone N= Noodles W= Weights   P= Paddles S= Speedo equipment K= Kickboard      Pt. Education: Gave pt tour of pool, explained how to use lockers, what to wear, that it would be in group setting, and showed pt aquatic equipment. Modalities:     Pt. Education:  -patient educated on diagnosis, prognosis and expectations for rehab  -all patient questions were answered    Home Exercise Program:  HEP instruction: Written HEP instructions provided and reviewed  Access Code: [email or web address]  URL: Striiv/  Date: 07/25/2022  Prepared by: Anna Alvarado     Exercises  Gastroc Stretch on Wall - 1 x daily - 7 x weekly - 1 sets - 10 reps - 30 sec hold  Standing Gastroc Stretch on Step - 1 x daily - 7 x weekly - 1 sets - 10 reps - 30 sec hold  Seated Hamstring Stretch - 1 x daily - 7 x weekly - 1 sets - 10 reps - 30 sec hold  Quadricep Stretch with Chair and Counter Support - 1 x daily - 7 x weekly - 1 sets - 10 reps - 30 sec hold  Seated Wrist Flexion Stretch - 1 x daily - 7 x weekly - 1 sets - 10 reps - 30 sec hold  Seated Wrist Extension Stretch - 1 x daily - 7 x weekly - 1 sets - 10 reps - 30 sec hold    Aquatics   [x] (27027) Aquatic therapy with therapeutic exercise. Provided verbal and tactile cueing for activities related to strengthening, flexibility, endurance, ROM for improvements in  [x] LE / lumbar: LE, proximal hip, and core control in self care, mobility, lifting, ambulation and eccentric single leg control.    [] UE / cervical: cervical, scapular, scapulothoracic and UE control with self care, reaching, carrying, lifting, house/yardwork, driving, computer work.   [] (77364) Therapist is in constant attendance of 2 or more patients providing skilled therapy interventions, but not providing any significant amount of measurable one-on-one time to either patient, for improvements in  [] LE / lumbar: LE, proximal hip, and core control in self care, mobility, lifting, ambulation and eccentric single leg control. [] UE / cervical: cervical, scapular, scapulothoracic and UE control with self care, reaching, carrying, lifting, house/yardwork, driving, computer work. Therapeutic Exercise and NMR EXR  [] (36597) Provided verbal/tactile cueing for activities related to strengthening, flexibility, endurance, ROM for improvements in  [] LE / Lumbar: LE, proximal hip, and core control with self care, mobility, lifting, ambulation. [] UE / Cervical: cervical, postural, scapular, scapulothoracic and UE control with self care, reaching, carrying, lifting, house/yardwork, driving, computer work.  [] (81629) Provided verbal/tactile cueing for activities related to improving balance, coordination, kinesthetic sense, posture, motor skill, proprioception to assist with   [] LE / lumbar: LE, proximal hip, and core control in self care, mobility, lifting, ambulation and eccentric single leg control. [] UE / cervical: cervical, scapular, scapulothoracic and UE control with self care, reaching, carrying, lifting, house/yardwork, driving, computer work.   [] (73929) Therapist is in constant attendance of 2 or more patients providing skilled therapy interventions, but not providing any significant amount of measurable one-on-one time to either patient, for improvements in  [] LE / lumbar: LE, proximal hip, and core control in self care, mobility, lifting, ambulation and eccentric single leg control. [] UE / cervical: cervical, scapular, scapulothoracic and UE control with self care, reaching, carrying, lifting, house/yardwork, driving, computer work.      NMR and Therapeutic Activities:    [] (99191 or 65058) Provided verbal/tactile cueing for activities related to improving balance, coordination, kinesthetic sense, posture, motor skill, proprioception and motor activation to allow for proper function of   [] LE: / Lumbar core, proximal hip and LE with self care and ADLs  [] UE / Cervical: cervical, postural, scapular, scapulothoracic and UE control with self care, carrying, lifting, driving, computer work.   [] (22369) Gait Re-education- Provided training and instruction to the patient for proper LE, core and proximal hip recruitment and positioning and eccentric body weight control with ambulation re-education including up and down stairs     Home Management Training / Self Care:  [] (44258) Provided self-care/home management training related to activities of daily living and compensatory training, and/or use of adaptive equipment for improvement with: ADLs and compensatory training, meal preparation, safety procedures and instruction in use of adaptive equipment, including bathing, grooming, dressing, personal hygiene, basic household cleaning and chores.      Home Exercise Program:    [] (30141) Reviewed/Progressed HEP activities related to strengthening, flexibility, endurance, ROM of   [] LE / Lumbar: core, proximal hip and LE for functional self-care, mobility, lifting and ambulation/stair navigation   [] UE / Cervical: cervical, postural, scapular, scapulothoracic and UE control with self care, reaching, carrying, lifting, house/yardwork, driving, computer work  [] (95309)Reviewed/Progressed HEP activities related to improving balance, coordination, kinesthetic sense, posture, motor skill, proprioception of   [] LE: core, proximal hip and LE for self care, mobility, lifting, and ambulation/stair navigation    [] UE / Cervical: cervical, postural,  scapular, scapulothoracic and UE control with self care, reaching, carrying, lifting, house/yardwork, driving, computer work    Manual Treatments:  PROM / STM / Oscillations-Mobs:  G-I, II, III, IV (PA's, Inf., Post.)  [] (92927) Provided manual therapy to mobilize LE, proximal hip and/or LS spine soft tissue/joints for the purpose of modulating pain, promoting relaxation,  increasing ROM, reducing/eliminating soft tissue swelling/inflammation/restriction, improving soft tissue extensibility and allowing for proper ROM for normal function with   [] LE / lumbar: self care, mobility, lifting and ambulation. [] UE / Cervical: self care, reaching, carrying, lifting, house/yardwork, driving, computer work. Modalities:  [] (64832) Vasopneumatic compression: Utilized vasopneumatic compression to decrease edema / swelling for the purpose of improving mobility and quad tone / recruitment which will allow for increased overall function including but not limited to self-care, transfers, ambulation, and ascending / descending stairs. Charges:  Timed Code Treatment Minutes: 32   Total Treatment Minutes: 32     [] EVAL - LOW (71374)   [] EVAL - MOD (35035)  [] EVAL - HIGH (51816)  [] RE-EVAL (81738)  [] GM(12620) x       [] Ionto  [] NMR (39864) x       [] Vaso  [] Manual (71223) x       [] Ultrasound  [] TA x        [] Mech Traction (00147)  [x] Aquatic Therapy x  2   [] ES (un) (10665):   [] Home Management Training x  [] ES(attended) (56154)   [] Dry Needling 1-2 muscles (50842):  [] Dry Needling 3+ muscles (425554)  [] Group:      [] Other:     GOALS:   Patient stated goal: To be able to walk and run again      Therapist goals for Patient:  Short Term Goals: To be achieved in: 2 weeks  1. Independent in HEP and progression per patient tolerance, in order to prevent re-injury. [] Progressing: [] Met: [] Not Met: [] Adjusted       2. Patient will have a decrease in pain to facilitate improvement in movement, function, and ADLs to allow decreased pain with walking. [] Progressing: [] Met: [] Not Met: [] Adjusted          Long Term Goals: To be achieved in: 6 weeks  1. FOTO score of at least 78 to assist with reaching prior level of function. [] Progressing: [] Met: [] Not Met: [] Adjusted       2. Patient will demonstrate increased AROM to [facility] to allow for proper joint functioning to allow decreased pain with housework.   [] Progressing: [] Met: [] Not Met: [] Adjusted       3. Patient will demonstrate an increase in postural awareness and control to allow for proper functional mobility with housework, walking, running. [] Progressing: [] Met: [] Not Met: [] Adjusted       4. Patient will demonstrate an increase in Strength to at least 5/5 to allow for proper functional mobility as indicated by patients decreased core control, pain with housework. [] Progressing: [] Met: [] Not Met: [] Adjusted       5. Patient will return to functional activities including walking without increased symptoms or restriction. [] Progressing: [] Met: [] Not Met: [] Adjusted       6. Patient will return to running without increased symptoms or restriction. [] Progressing: [] Met: [] Not Met: [] Adjusted         Overall Progression Towards Functional goals/ Treatment Progress Update:  [] Patient is progressing as expected towards functional goals listed. [] Progression is slowed due to complexities/Impairments listed. [] Progression has been slowed due to co-morbidities. [x] Plan just implemented, too soon to assess goals progression <30days   [] Goals require adjustment due to lack of progress  [] Patient is not progressing as expected and requires additional follow up with physician  [] Other    Persisting Functional Limitations/Impairments:  []Sleeping []Sitting               [x]Standing [x]Transfers        [x]Walking []Kneeling               [x]Stairs [x]Squatting / bending   []ADLs []Reaching  [x]Lifting  [x]Housework  []Driving []Job related tasks  [x]Sports/Recreation []Other:        ASSESSMENT:  Pt feeling much more comfortable in the pool this date. She wore the life jacket and used two pool noodles when not holding onto the wall. Still wanted to stay along the rail and in the 3 foot section. Added balancing exercises as noted in bold above, with more difficulty and more pain with left ankle. Kept reps at 10  again this date.  Was able to walk the entire length of the pool this date, with patient using the ramp edge for part of it. Continue to  improve confidence and progress as tolerated     Treatment/Activity Tolerance:  [x] Patient able to complete tx [] Patient limited by fatigue  [] Patient limited by pain  [] Patient limited by other medical complications  [] Other:     Prognosis: [x] Good [] Fair  [] Poor    Patient Requires Follow-up: [x] Yes  [] No    Plan for next treatment session: Aquatics     PLAN: See eval. PT 2x / week for 6 weeks. [x] Continue per plan of care [] Alter current plan (see comments)  [] Plan of care initiated [] Hold pending MD visit [] Discharge    Electronically signed by: Bernice Pina, PT PT         Note: If patient does not return for scheduled/ recommended follow up visits, this note will serve as a discharge from care along with most recent update on progress.

## 2022-08-16 ENCOUNTER — HOSPITAL ENCOUNTER (OUTPATIENT)
Dept: PHYSICAL THERAPY | Age: 67
Setting detail: THERAPIES SERIES
Discharge: HOME | End: 2022-08-16
Payer: OTHER MISCELLANEOUS

## 2022-08-16 PROCEDURE — 97150 GROUP THERAPEUTIC PROCEDURES: CPT

## 2022-08-16 PROCEDURE — 97113 AQUATIC THERAPY/EXERCISES: CPT

## 2022-08-16 NOTE — FLOWSHEET NOTE
[address] [facility]  Phone: [phone]   Fax: [phone]    Physical Therapy Daily Treatment Note  Date:  2022    Patient Name:  Theodora Arenas    :  1955  MRN: [id number]  Medical/Treatment Diagnosis Information:  Diagnosis: M79.671, M79.672 (ICD-10-CM) - Foot pain, bilateral  M25.571, M25.572 (ICD-10-CM) - Acute bilateral ankle pain  Treatment Diagnosis: bilateral ankle and UE pain, decreased trunk control, decreased UE strength and ROM  Insurance/Certification information:   PT Insurance Information: farmer's insurance  Physician Information:   [address] signed (Y/N): []  Yes [x]  No -sent on     Date of Patient follow up with Physician:      Progress Report: []  Yes  [x]  No     Date Range for reporting period:  Beginnin/25  Ending:     Progress report due (10 Rx/or 30 days whichever is less): visit #10 or  (date)     Recertification due (POC duration/ or 90 days whichever is less): visit #12 or 10/25 (date)     Visit # Insurance Allowable Auth required? Date Range   1+   []  Yes  [x]  No      Latex Allergy:  [x]NO      []YES  Preferred Language for Healthcare:   [x]English       []other:    Functional Scale:           Date assessed:  TO physical FS primary measure score = 67; risk adjusted = 56      Pain level:  3/10 -generally     SUBJECTIVE:  Pt reports that she is doing well. Feels much more confident in the water, doesn't think she needs someone in the pool with her anymore.      OBJECTIVE: See eval      RESTRICTIONS/PRECAUTIONS: NA    Exercises/Interventions:     Therapeutic Exercises (82659) Resistance / level Sets/sec Reps Notes                                                                  Therapeutic Activities (07875)                                          Neuromuscular Re-ed (77074)                                                 Manual Intervention (53961) AquaticTherapy Dates of Service: 8/9, 8/11, 8/16  Aquatic Visits Exercises/Activities: *wears life jacket   but no longer requires assistance in the pool*   Transfers:  [] Stairs   [x] Ramp  [] Chair Lift   % Immersion:            Ambulation/ Warm up:   UE Exercises: Forward  X 2 laps full length along railing of ramp with 2 noodles in other hand Shoulder Shrugs  x    Lateral   X 2 laps full length along railing of ramp with 2 noodles in other hand Shoulder Circles  x    Retro   X Scapular Retraction   x   [place]    Push Downs      Heel/Toe Walking    Punching       Rowing       Elbow Flex/Ext X 10      Shldr Flex/Ext X 10      Shldr aBd/aDd X 10   LE Exercises:  Shldr Horiz aBd/aDd x   HR/TR x10 Shldr IR/ER    Marches x10 Arm Circles X 10 cw/ccw   Squats x10 PNF Diagonals    Hamstring Curls x10 Wall Push Ups    Hip Flexion (SLR) x10     Hip aBduction (SLR) x10     Hip Extension (SLR) x10      Hip aDduction (SLR)      Hip Circles x10 Functional:    Hip IR/Er  Step up forward x   Hip Hikes  Step up lateral  x     Step down        Lunges Forward      Lunges Retro      Lunges Lateral     Balance:        SLS  20\" x 2       Tandem Stance 20\" x 2       NBOS eyes open  Seated:     NBOS eyes closed  Ankle pumps  X 20    Hand to Opposite Knee x Ankle Circles  X 20    Fwd Step ups to SLS  Knee Flex/Ext    Lateral Step ups to SLS  Hip aBd/aDd    Stop/Go Gait   Bicycle       Ankle DF/PF X 20      Ankle Inv/Ev X 20    Stretching: Ankle ABC's X 1   Gastroc/Soleus x     Hamstring  x Deep Water:    Knee Flex Stretch  Jog    Piriformis   Noodle Hang    Hip Flexor  Traction at Wall X 5 mins holding on to both rails at [facility]      DKTC       ITB  Cool Down:    Quad  Fwd Walking x   Mid Back   Lat Walking    UT  Retro Walking    Post Shoulder  Noodle float    Ladder Pull x     Pec Stretch            Core:   Other:    TrA set      Pelvic Tilts      Multifidi Walk outs c paddle      PNF Chop/Lifts Aquatic Abbreviation Key  B= Belt DB= Dumbells T= Theratube   H= Hydrotone N= Noodles W= Weights   P= Paddles S= Speedo equipment K= Kickboard      Pt. Education: Gave pt tour of pool, explained how to use lockers, what to wear, that it would be in group setting, and showed pt aquatic equipment. Modalities:     Pt. Education:  -patient educated on diagnosis, prognosis and expectations for rehab  -all patient questions were answered    Home Exercise Program:  HEP instruction: Written HEP instructions provided and reviewed  Access Code: [email or web address]  URL: [employer]/  Date: 2022  Prepared by: Sergio Life     Exercises  Gastroc Stretch on Wall - 1 x daily - 7 x weekly - 1 sets - 10 reps - 30 sec hold  Standing Gastroc Stretch on Step - 1 x daily - 7 x weekly - 1 sets - 10 reps - 30 sec hold  Seated Hamstring Stretch - 1 x daily - 7 x weekly - 1 sets - 10 reps - 30 sec hold  Quadricep Stretch with Chair and Counter Support - 1 x daily - 7 x weekly - 1 sets - 10 reps - 30 sec hold  Seated Wrist Flexion Stretch - 1 x daily - 7 x weekly - 1 sets - 10 reps - 30 sec hold  Seated Wrist Extension Stretch - 1 x daily - 7 x weekly - 1 sets - 10 reps - 30 sec hold    Aquatics   [x] (87467) Aquatic therapy with therapeutic exercise. Provided verbal and tactile cueing for activities related to strengthening, flexibility, endurance, ROM for improvements in  [x] LE / lumbar: LE, proximal hip, and core control in self care, mobility, lifting, ambulation and eccentric single leg control.    [] UE / cervical: cervical, scapular, scapulothoracic and UE control with self care, reaching, carrying, lifting, house/yardwork, driving, computer work.   [] (07600) Therapist is in constant attendance of 2 or more patients providing skilled therapy interventions, but not providing any significant amount of measurable one-on-one time to either patient, for improvements in  [] LE / lumbar: LE, proximal hip, and core control in self care, mobility, lifting, ambulation and eccentric single leg control. [] UE / cervical: cervical, scapular, scapulothoracic and UE control with self care, reaching, carrying, lifting, house/yardwork, driving, computer work. Therapeutic Exercise and NMR EXR  [] (23855) Provided verbal/tactile cueing for activities related to strengthening, flexibility, endurance, ROM for improvements in  [] LE / Lumbar: LE, proximal hip, and core control with self care, mobility, lifting, ambulation. [] UE / Cervical: cervical, postural, scapular, scapulothoracic and UE control with self care, reaching, carrying, lifting, house/yardwork, driving, computer work.  [] (41680) Provided verbal/tactile cueing for activities related to improving balance, coordination, kinesthetic sense, posture, motor skill, proprioception to assist with   [] LE / lumbar: LE, proximal hip, and core control in self care, mobility, lifting, ambulation and eccentric single leg control. [] UE / cervical: cervical, scapular, scapulothoracic and UE control with self care, reaching, carrying, lifting, house/yardwork, driving, computer work.   [] (50680) Therapist is in constant attendance of 2 or more patients providing skilled therapy interventions, but not providing any significant amount of measurable one-on-one time to either patient, for improvements in  [] LE / lumbar: LE, proximal hip, and core control in self care, mobility, lifting, ambulation and eccentric single leg control. [] UE / cervical: cervical, scapular, scapulothoracic and UE control with self care, reaching, carrying, lifting, house/yardwork, driving, computer work.      NMR and Therapeutic Activities:    [] (88315 or 77272) Provided verbal/tactile cueing for activities related to improving balance, coordination, kinesthetic sense, posture, motor skill, proprioception and motor activation to allow for proper function of   [] LE: / Lumbar core, proximal hip and LE with self care and ADLs  [] UE / Cervical: cervical, postural, scapular, scapulothoracic and UE control with self care, carrying, lifting, driving, computer work.   [] (82664) Gait Re-education- Provided training and instruction to the patient for proper LE, core and proximal hip recruitment and positioning and eccentric body weight control with ambulation re-education including up and down stairs     Home Management Training / Self Care:  [] (37299) Provided self-care/home management training related to activities of daily living and compensatory training, and/or use of adaptive equipment for improvement with: ADLs and compensatory training, meal preparation, safety procedures and instruction in use of adaptive equipment, including bathing, grooming, dressing, personal hygiene, basic household cleaning and chores.      Home Exercise Program:    [] (12057) Reviewed/Progressed HEP activities related to strengthening, flexibility, endurance, ROM of   [] LE / Lumbar: core, proximal hip and LE for functional self-care, mobility, lifting and ambulation/stair navigation   [] UE / Cervical: cervical, postural, scapular, scapulothoracic and UE control with self care, reaching, carrying, lifting, house/yardwork, driving, computer work  [] (83053)Reviewed/Progressed HEP activities related to improving balance, coordination, kinesthetic sense, posture, motor skill, proprioception of   [] LE: core, proximal hip and LE for self care, mobility, lifting, and ambulation/stair navigation    [] UE / Cervical: cervical, postural,  scapular, scapulothoracic and UE control with self care, reaching, carrying, lifting, house/yardwork, driving, computer work    Manual Treatments:  PROM / STM / Oscillations-Mobs:  G-I, II, III, IV (PA's, Inf., Post.)  [] (65886) Provided manual therapy to mobilize LE, proximal hip and/or LS spine soft tissue/joints for the purpose of modulating pain, promoting relaxation,  increasing ROM, reducing/eliminating soft tissue swelling/inflammation/restriction, improving soft tissue extensibility and allowing for proper ROM for normal function with   [] LE / lumbar: self care, mobility, lifting and ambulation. [] UE / Cervical: self care, reaching, carrying, lifting, house/yardwork, driving, computer work. Modalities:  [] (74473) Vasopneumatic compression: Utilized vasopneumatic compression to decrease edema / swelling for the purpose of improving mobility and quad tone / recruitment which will allow for increased overall function including but not limited to self-care, transfers, ambulation, and ascending / descending stairs. Charges:  Timed Code Treatment Minutes: 23   Total Treatment Minutes: 45     [] EVAL - LOW (73943)   [] EVAL - MOD (95973)  [] EVAL - HIGH (39174)  [] RE-EVAL (55707)  [] KV(15881) x       [] Ionto  [] NMR (30038) x       [] Vaso  [] Manual (22186) x       [] Ultrasound  [] TA x        [] Mech Traction (02100)  [x] Aquatic Therapy x  2   [] ES (un) (46629):   [] Home Management Training x  [] ES(attended) (23452)   [] Dry Needling 1-2 muscles (51498):  [] Dry Needling 3+ muscles (419416)  [x] Group: 1     [] Other:     GOALS:   Patient stated goal: To be able to walk and run again      Therapist goals for Patient:  Short Term Goals: To be achieved in: 2 weeks  1. Independent in HEP and progression per patient tolerance, in order to prevent re-injury. [] Progressing: [] Met: [] Not Met: [] Adjusted       2. Patient will have a decrease in pain to facilitate improvement in movement, function, and ADLs to allow decreased pain with walking. [] Progressing: [] Met: [] Not Met: [] Adjusted          Long Term Goals: To be achieved in: 6 weeks  1. FOTO score of at least 78 to assist with reaching prior level of function. [] Progressing: [] Met: [] Not Met: [] Adjusted       2.  Patient will demonstrate increased AROM to [facility] to allow for proper joint functioning to allow decreased pain with housework. [] Progressing: [] Met: [] Not Met: [] Adjusted       3. Patient will demonstrate an increase in postural awareness and control to allow for proper functional mobility with housework, walking, running. [] Progressing: [] Met: [] Not Met: [] Adjusted       4. Patient will demonstrate an increase in Strength to at least 5/5 to allow for proper functional mobility as indicated by patients decreased core control, pain with housework. [] Progressing: [] Met: [] Not Met: [] Adjusted       5. Patient will return to functional activities including walking without increased symptoms or restriction. [] Progressing: [] Met: [] Not Met: [] Adjusted       6. Patient will return to running without increased symptoms or restriction. [] Progressing: [] Met: [] Not Met: [] Adjusted         Overall Progression Towards Functional goals/ Treatment Progress Update:  [] Patient is progressing as expected towards functional goals listed. [] Progression is slowed due to complexities/Impairments listed. [] Progression has been slowed due to co-morbidities. [x] Plan just implemented, too soon to assess goals progression <30days   [] Goals require adjustment due to lack of progress  [] Patient is not progressing as expected and requires additional follow up with physician  [] Other    Persisting Functional Limitations/Impairments:  []Sleeping []Sitting               [x]Standing [x]Transfers        [x]Walking []Kneeling               [x]Stairs [x]Squatting / bending   []ADLs []Reaching  [x]Lifting  [x]Housework  []Driving []Job related tasks  [x]Sports/Recreation []Other:        ASSESSMENT:  Progress independence in the pool this date. Pt did not require physical assistance in the pool, no longer requires RED scheduling. Pt would continued to benefit from skilled physical therapy.      Treatment/Activity Tolerance:  [x] Patient able to complete tx [] Patient limited by fatigue  [] Patient limited by pain  [] Patient limited by other medical complications  [] Other:     Prognosis: [x] Good [] Fair  [] Poor    Patient Requires Follow-up: [x] Yes  [] No    Plan for next treatment session: Aquatics     PLAN: See lucy. PT 2x / week for 6 weeks. [x] Continue per plan of care [] Alter current plan (see comments)  [] Plan of care initiated [] Hold pending MD visit [] Discharge    Electronically signed by: Faiza Stanton PT, DPT         Note: If patient does not return for scheduled/ recommended follow up visits, this note will serve as a discharge from care along with most recent update on progress.

## 2022-08-18 ENCOUNTER — HOSPITAL ENCOUNTER (OUTPATIENT)
Dept: PHYSICAL THERAPY | Age: 67
Setting detail: THERAPIES SERIES
Discharge: HOME | End: 2022-08-18
Payer: OTHER MISCELLANEOUS

## 2022-08-18 PROCEDURE — 97113 AQUATIC THERAPY/EXERCISES: CPT

## 2022-08-18 NOTE — FLOWSHEET NOTE
[facility]  Phone: [phone]   Fax: [phone]    Physical Therapy Daily Treatment Note  Date:  2022    Patient Name:  Ruperto Mora    :  1955  MRN: [id number]  Medical/Treatment Diagnosis Information:  Diagnosis: M79.671, M79.672 (ICD-10-CM) - Foot pain, bilateral  M25.571, M25.572 (ICD-10-CM) - Acute bilateral ankle pain  Treatment Diagnosis: bilateral ankle and UE pain, decreased trunk control, decreased UE strength and ROM  Insurance/Certification information:   PT Insurance Information: farmer's insurance  Physician Information:   Alisha Rico, [address] [place] [address] signed (Y/N): []  Yes [x]  No -sent on     Date of Patient follow up with Physician:      Progress Report: []  Yes  [x]  No     Date Range for reporting period:  Beginnin/25  Ending:     Progress report due (10 Rx/or 30 days whichever is less): visit #10 or  (date)     Recertification due (POC duration/ or 90 days whichever is less): visit #12 or 10/25 (date)     Visit # Insurance Allowable Auth required? Date Range   1+   []  Yes  [x]  No      Latex Allergy:  [x]NO      []YES  Preferred Language for Healthcare:   [x]English       []other:    Functional Scale:           Date assessed:  FOTO physical FS primary measure score = 67; risk adjusted = 56      Pain level:  3/10 -generally     SUBJECTIVE:  Pain is a 4/10 today.      OBJECTIVE: See eval      RESTRICTIONS/PRECAUTIONS: NA    Exercises/Interventions:     Therapeutic Exercises (55386) Resistance / level Sets/sec Reps Notes                                                                  Therapeutic Activities (30322)                                          Neuromuscular Re-ed (19056)                                                 Manual Intervention (39356)                                                   AquaticTherapy Dates of Service: , , ,   Aquatic Visits Exercises/Activities: *wears life jacket but no longer requires assistance in the pool*   Transfers:  [] Stairs   [x] Ramp  [] Chair Lift   % Immersion:            Ambulation/ Warm up:   UE Exercises: Forward  X 2 laps full length along railing of ramp with 2 noodles in other hand Shoulder Shrugs  x    Lateral   X 2 laps full length along railing of ramp with 2 noodles in other hand Shoulder Circles  x    Retro   X Scapular Retraction   x   [place]    Push Downs      Heel/Toe Walking    Punching       Rowing       Elbow Flex/Ext X 12      Shldr Flex/Ext X 12      Shldr aBd/aDd X 12   LE Exercises:  Shldr Horiz aBd/aDd x   HR/TR x12 Shldr IR/ER    Marches x12 Arm Circles X 12 cw/ccw   Squats x12 PNF Diagonals    Hamstring Curls x12 Wall Push Ups    Hip Flexion (SLR) x12     Hip aBduction (SLR) x12     Hip Extension (SLR) x12      Hip aDduction (SLR)      Hip Circles x12 Functional:    Hip IR/Er  Step up forward x   Hip Hikes  Step up lateral  x     Step down        Lunges Forward      Lunges Retro      Lunges Lateral     Balance:        SLS  20\" x 2       Tandem Stance 20\" x 2       NBOS eyes open  Seated:     NBOS eyes closed  Ankle pumps  X 20    Hand to Opposite Knee x Ankle Circles  X 20    Fwd Step ups to SLS  Knee Flex/Ext    Lateral Step ups to SLS  Hip aBd/aDd    Stop/Go Gait   Bicycle       Ankle DF/PF X 20      Ankle Inv/Ev X 20    Stretching: Ankle ABC's X 1   Gastroc/Soleus x     Hamstring  x Deep Water:    Knee Flex Stretch  Jog    Piriformis   Noodle Hang    Hip Flexor  Traction at Wall X 5 mins holding on to both rails at [facility]      DKTC       ITB  Cool Down:    Quad  Fwd Walking x   Mid Back   Lat Walking    UT  Retro Walking    Post Shoulder  Noodle float    Ladder Pull x     Pec Stretch            Core:   Other:    TrA set      Pelvic Tilts      Multifidi Walk outs c paddle      PNF Chop/Lifts                          Aquatic Abbreviation Key  B= Belt DB= Dumbells T= Theratube   H= Hydrotone N= Noodles W= Weights   P= Paddles S= Speedo equipment K= Kickboard      Pt. Education: Gave pt tour of pool, explained how to use lockers, what to wear, that it would be in group setting, and showed pt aquatic equipment. Modalities:     Pt. Education:  -patient educated on diagnosis, prognosis and expectations for rehab  -all patient questions were answered    Home Exercise Program:  HEP instruction: Written HEP instructions provided and reviewed  Access Code: [email or web address]  URL: [email or web address]  Date: 07/25/2022  Prepared by: Harsh Tipton     Exercises  Gastroc Stretch on Wall - 1 x daily - 7 x weekly - 1 sets - 10 reps - 30 sec hold  Standing Gastroc Stretch on Step - 1 x daily - 7 x weekly - 1 sets - 10 reps - 30 sec hold  Seated Hamstring Stretch - 1 x daily - 7 x weekly - 1 sets - 10 reps - 30 sec hold  Quadricep Stretch with Chair and Counter Support - 1 x daily - 7 x weekly - 1 sets - 10 reps - 30 sec hold  Seated Wrist Flexion Stretch - 1 x daily - 7 x weekly - 1 sets - 10 reps - 30 sec hold  Seated Wrist Extension Stretch - 1 x daily - 7 x weekly - 1 sets - 10 reps - 30 sec hold    Aquatics   [x] (74775) Aquatic therapy with therapeutic exercise. Provided verbal and tactile cueing for activities related to strengthening, flexibility, endurance, ROM for improvements in  [x] LE / lumbar: LE, proximal hip, and core control in self care, mobility, lifting, ambulation and eccentric single leg control.    [] UE / cervical: cervical, scapular, scapulothoracic and UE control with self care, reaching, carrying, lifting, house/yardwork, driving, computer work.   [] (46085) Therapist is in constant attendance of 2 or more patients providing skilled therapy interventions, but not providing any significant amount of measurable one-on-one time to either patient, for improvements in  [] LE / lumbar: LE, proximal hip, and core control in self care, mobility, lifting, ambulation and eccentric single leg control. [] UE / cervical: cervical, scapular, scapulothoracic and UE control with self care, reaching, carrying, lifting, house/yardwork, driving, computer work. Therapeutic Exercise and NMR EXR  [] (54555) Provided verbal/tactile cueing for activities related to strengthening, flexibility, endurance, ROM for improvements in  [] LE / Lumbar: LE, proximal hip, and core control with self care, mobility, lifting, ambulation. [] UE / Cervical: cervical, postural, scapular, scapulothoracic and UE control with self care, reaching, carrying, lifting, house/yardwork, driving, computer work.  [] (02177) Provided verbal/tactile cueing for activities related to improving balance, coordination, kinesthetic sense, posture, motor skill, proprioception to assist with   [] LE / lumbar: LE, proximal hip, and core control in self care, mobility, lifting, ambulation and eccentric single leg control. [] UE / cervical: cervical, scapular, scapulothoracic and UE control with self care, reaching, carrying, lifting, house/yardwork, driving, computer work.   [] (02199) Therapist is in constant attendance of 2 or more patients providing skilled therapy interventions, but not providing any significant amount of measurable one-on-one time to either patient, for improvements in  [] LE / lumbar: LE, proximal hip, and core control in self care, mobility, lifting, ambulation and eccentric single leg control. [] UE / cervical: cervical, scapular, scapulothoracic and UE control with self care, reaching, carrying, lifting, house/yardwork, driving, computer work.      NMR and Therapeutic Activities:    [] (65482 or 12383) Provided verbal/tactile cueing for activities related to improving balance, coordination, kinesthetic sense, posture, motor skill, proprioception and motor activation to allow for proper function of   [] LE: / Lumbar core, proximal hip and LE with self care and ADLs  [] UE / Cervical: cervical, postural, scapular, scapulothoracic and UE control with self care, carrying, lifting, driving, computer work.   [] (83259) Gait Re-education- Provided training and instruction to the patient for proper LE, core and proximal hip recruitment and positioning and eccentric body weight control with ambulation re-education including up and down stairs     Home Management Training / Self Care:  [] (60250) Provided self-care/home management training related to activities of daily living and compensatory training, and/or use of adaptive equipment for improvement with: ADLs and compensatory training, meal preparation, safety procedures and instruction in use of adaptive equipment, including bathing, grooming, dressing, personal hygiene, basic household cleaning and chores.      Home Exercise Program:    [] (30837) Reviewed/Progressed HEP activities related to strengthening, flexibility, endurance, ROM of   [] LE / Lumbar: core, proximal hip and LE for functional self-care, mobility, lifting and ambulation/stair navigation   [] UE / Cervical: cervical, postural, scapular, scapulothoracic and UE control with self care, reaching, carrying, lifting, house/yardwork, driving, computer work  [] (53718)Reviewed/Progressed HEP activities related to improving balance, coordination, kinesthetic sense, posture, motor skill, proprioception of   [] LE: core, proximal hip and LE for self care, mobility, lifting, and ambulation/stair navigation    [] UE / Cervical: cervical, postural,  scapular, scapulothoracic and UE control with self care, reaching, carrying, lifting, house/yardwork, driving, computer work    Manual Treatments:  PROM / STM / Oscillations-Mobs:  G-I, II, III, IV (PA's, Inf., Post.)  [] (04247) Provided manual therapy to mobilize LE, proximal hip and/or LS spine soft tissue/joints for the purpose of modulating pain, promoting relaxation,  increasing ROM, reducing/eliminating soft tissue swelling/inflammation/restriction, improving soft tissue extensibility and allowing for proper ROM for normal function with   [] LE / lumbar: self care, mobility, lifting and ambulation. [] UE / Cervical: self care, reaching, carrying, lifting, house/yardwork, driving, computer work. Modalities:  [] (83814) Vasopneumatic compression: Utilized vasopneumatic compression to decrease edema / swelling for the purpose of improving mobility and quad tone / recruitment which will allow for increased overall function including but not limited to self-care, transfers, ambulation, and ascending / descending stairs. Charges:  Timed Code Treatment Minutes: 38   Total Treatment Minutes: 38     [] EVAL - LOW (19674)   [] EVAL - MOD (88952)  [] EVAL - HIGH (20235)  [] RE-EVAL (05040)  [] KM(18930) x       [] Ionto  [] NMR (21047) x       [] Vaso  [] Manual (13526) x       [] Ultrasound  [] TA x        [] Mech Traction (91564)  [x] Aquatic Therapy x  3   [] ES (un) (52854):   [] Home Management Training x  [] ES(attended) (35818)   [] Dry Needling 1-2 muscles (71035):  [] Dry Needling 3+ muscles (892336)  [] Group: 1     [] Other:     GOALS:   Patient stated goal: To be able to walk and run again      Therapist goals for Patient:  Short Term Goals: To be achieved in: 2 weeks  1. Independent in HEP and progression per patient tolerance, in order to prevent re-injury. [] Progressing: [] Met: [] Not Met: [] Adjusted       2. Patient will have a decrease in pain to facilitate improvement in movement, function, and ADLs to allow decreased pain with walking. [] Progressing: [] Met: [] Not Met: [] Adjusted          Long Term Goals: To be achieved in: 6 weeks  1. FOTO score of at least 78 to assist with reaching prior level of function. [] Progressing: [] Met: [] Not Met: [] Adjusted       2. Patient will demonstrate increased AROM to [facility] to allow for proper joint functioning to allow decreased pain with housework. [] Progressing: [] Met: [] Not Met: [] Adjusted       3.  Patient will demonstrate an increase in postural awareness and control to allow for proper functional mobility with housework, walking, running. [] Progressing: [] Met: [] Not Met: [] Adjusted       4. Patient will demonstrate an increase in Strength to at least 5/5 to allow for proper functional mobility as indicated by patients decreased core control, pain with housework. [] Progressing: [] Met: [] Not Met: [] Adjusted       5. Patient will return to functional activities including walking without increased symptoms or restriction. [] Progressing: [] Met: [] Not Met: [] Adjusted       6. Patient will return to running without increased symptoms or restriction. [] Progressing: [] Met: [] Not Met: [] Adjusted         Overall Progression Towards Functional goals/ Treatment Progress Update:  [] Patient is progressing as expected towards functional goals listed. [] Progression is slowed due to complexities/Impairments listed. [] Progression has been slowed due to co-morbidities. [x] Plan just implemented, too soon to assess goals progression <30days   [] Goals require adjustment due to lack of progress  [] Patient is not progressing as expected and requires additional follow up with physician  [] Other    Persisting Functional Limitations/Impairments:  []Sleeping []Sitting               [x]Standing [x]Transfers        [x]Walking []Kneeling               [x]Stairs [x]Squatting / bending   []ADLs []Reaching  [x]Lifting  [x]Housework  []Driving []Job related tasks  [x]Sports/Recreation []Other:        ASSESSMENT:  Pt with good tolerance to increased reps. Continue to progress as tolerated.      Treatment/Activity Tolerance:  [x] Patient able to complete tx [] Patient limited by fatigue  [] Patient limited by pain  [] Patient limited by other medical complications  [] Other:     Prognosis: [x] Good [] Fair  [] Poor    Patient Requires Follow-up: [x] Yes  [] No    Plan for next treatment session: Aquatics     PLAN: See lucy. PT 2x / week for 6 weeks. [x] Continue per plan of care [] Alter current plan (see comments)  [] Plan of care initiated [] Hold pending MD visit [] Discharge    Electronically signed by: Antelmo Malloy, PT, DPT         Note: If patient does not return for scheduled/ recommended follow up visits, this note will serve as a discharge from care along with most recent update on progress.

## 2022-08-23 ENCOUNTER — HOSPITAL ENCOUNTER (OUTPATIENT)
Dept: PHYSICAL THERAPY | Age: 67
Setting detail: THERAPIES SERIES
Discharge: HOME | End: 2022-08-23
Payer: OTHER MISCELLANEOUS

## 2022-08-23 PROCEDURE — 97150 GROUP THERAPEUTIC PROCEDURES: CPT

## 2022-08-23 PROCEDURE — 97113 AQUATIC THERAPY/EXERCISES: CPT

## 2022-08-23 NOTE — FLOWSHEET NOTE
[facility] Physical Therapy  Phone: [phone]   Fax: [phone]    Physical Therapy Daily Treatment Note  Date:  2022    Patient Name:  Veronica Matthews    :  1955  MRN: [id number]  Medical/Treatment Diagnosis Information:  Diagnosis: M79.671, M79.672 (ICD-10-CM) - Foot pain, bilateral  M25.571, M25.572 (ICD-10-CM) - Acute bilateral ankle pain  Treatment Diagnosis: bilateral ankle and UE pain, decreased trunk control, decreased UE strength and ROM  Insurance/Certification information:   PT Insurance Information: farmer's insurance  Physician Information:   Lydia Foreman, [address] [place] [address] signed (Y/N): []  Yes [x]  No -sent on     Date of Patient follow up with Physician:      Progress Report: []  Yes  [x]  No     Date Range for reporting period:  Beginnin/25  Ending:     Progress report due (10 Rx/or 30 days whichever is less): visit #10 or  (date)     Recertification due (POC duration/ or 90 days whichever is less): visit #12 or 10/25 (date)     Visit # Insurance Allowable Auth required? Date Range   +   []  Yes  [x]  No      Latex Allergy:  [x]NO      []YES  Preferred Language for Healthcare:   [x]English       []other:    Functional Scale:           Date assessed:  TO physical FS primary measure score = 67; risk adjusted = 56      Pain level: 5/10 L lateral hip and proximal thigh       SUBJECTIVE:    Having some more L hip pain today, feels like it's catching, is located more laterally and outside of upper thigh.       OBJECTIVE: See eval      RESTRICTIONS/PRECAUTIONS: NA    Exercises/Interventions:   Therapeutic Exercises (70338) Resistance / level Sets/sec Reps Notes                                                                  Therapeutic Activities (13391)                                          Neuromuscular Re-ed (51331)                                                 Manual Intervention (57203) AquaticTherapy Dates of Service: 8/9, 8/11, 8/16, 8/18, 8/23  Aquatic Visits Exercises/Activities: *wears life jacket but no longer requires assistance in the pool*   Transfers:  [] Stairs   [x] Ramp  [] Chair Lift   % Immersion:            Ambulation/ Warm up:   UE Exercises: Forward  X 2 laps full length along railing of ramp with BUE on 2 noodles  Shoulder Shrugs  x    Lateral   X 2 laps full length along railing of ramp with BUE on 2 noodles  Shoulder Circles  x    Retro   X Scapular Retraction   x   [place]    Push Downs      Heel/Toe Walking    Punching       Rowing       Elbow Flex/Ext X 12      Shldr Flex/Ext X 12      Shldr aBd/aDd X 12   LE Exercises:  Shldr Horiz aBd/aDd x   HR/TR x12 Shldr IR/ER    Marches x12 Arm Circles X 12 cw/ccw   Squats x12 PNF Diagonals    Hamstring Curls x12 Wall Push Ups    Hip Flexion (SLR) x12     Hip aBduction (SLR) x12     Hip Extension (SLR) x12      Hip aDduction (SLR)      Hip Circles x12 Functional:    Hip IR/Er  Step up forward x   Hip Hikes  Step up lateral  x     Step down        Lunges Forward      Lunges Retro      Lunges Lateral     Balance:        SLS  20\" x 2       Tandem Stance 20\" x 2       NBOS eyes open  Seated:     NBOS eyes closed  Ankle pumps  X 20    Hand to Opposite Knee x Ankle Circles  X 20    Fwd Step ups to SLS  Knee Flex/Ext    Lateral Step ups to SLS  Hip aBd/aDd    Stop/Go Gait   Bicycle in corner X3 mins     Ankle DF/PF X 20      Ankle Inv/Ev X 20    Stretching: Ankle ABC's X 1   Gastroc/Soleus x     Hamstring  x Deep Water:    Knee Flex Stretch  Jog    Piriformis   Noodle Hang    Hip Flexor  Traction at [address] [facility]      DKTC       ITB  Cool Down:    Quad  Fwd Walking x   Mid Back   Lat Walking    UT  Retro Walking    Post Shoulder  Noodle float    Ladder Pull x     Pec Stretch            Core:   Other:    TrA set      Pelvic Tilts      Multifidi Walk outs c paddle      PNF Chop/Lifts                          Aquatic Abbreviation Key  B= Belt DB= Dumbells T= Theratube   H= Hydrotone N= Noodles W= Weights   P= Paddles S= Speedo equipment K= Kickboard      Pt. Education: Gave pt tour of pool, explained how to use lockers, what to wear, that it would be in group setting, and showed pt aquatic equipment. Modalities:     Pt. Education:  -patient educated on diagnosis, prognosis and expectations for rehab  -all patient questions were answered    Home Exercise Program:  HEP instruction: Written HEP instructions provided and reviewed  Access Code: [email or web address]  URL: [email or web address]/  Date: 07/25/2022  Prepared by: Fabio Babb     Exercises  Gastroc Stretch on Wall - 1 x daily - 7 x weekly - 1 sets - 10 reps - 30 sec hold  Standing Gastroc Stretch on Step - 1 x daily - 7 x weekly - 1 sets - 10 reps - 30 sec hold  Seated Hamstring Stretch - 1 x daily - 7 x weekly - 1 sets - 10 reps - 30 sec hold  Quadricep Stretch with Chair and Counter Support - 1 x daily - 7 x weekly - 1 sets - 10 reps - 30 sec hold  Seated Wrist Flexion Stretch - 1 x daily - 7 x weekly - 1 sets - 10 reps - 30 sec hold  Seated Wrist Extension Stretch - 1 x daily - 7 x weekly - 1 sets - 10 reps - 30 sec hold    Aquatics   [x] (32453) Aquatic therapy with therapeutic exercise. Provided verbal and tactile cueing for activities related to strengthening, flexibility, endurance, ROM for improvements in  [x] LE / lumbar: LE, proximal hip, and core control in self care, mobility, lifting, ambulation and eccentric single leg control.    [] UE / cervical: cervical, scapular, scapulothoracic and UE control with self care, reaching, carrying, lifting, house/yardwork, driving, computer work.   [] (23769) Therapist is in constant attendance of 2 or more patients providing skilled therapy interventions, but not providing any significant amount of measurable one-on-one time to either patient, for improvements in  [] LE / lumbar: LE, proximal hip, and core control in self care, mobility, lifting, ambulation and eccentric single leg control. [] UE / cervical: cervical, scapular, scapulothoracic and UE control with self care, reaching, carrying, lifting, house/yardwork, driving, computer work. Therapeutic Exercise and NMR EXR  [] (52011) Provided verbal/tactile cueing for activities related to strengthening, flexibility, endurance, ROM for improvements in  [] LE / Lumbar: LE, proximal hip, and core control with self care, mobility, lifting, ambulation. [] UE / Cervical: cervical, postural, scapular, scapulothoracic and UE control with self care, reaching, carrying, lifting, house/yardwork, driving, computer work.  [] (99103) Provided verbal/tactile cueing for activities related to improving balance, coordination, kinesthetic sense, posture, motor skill, proprioception to assist with   [] LE / lumbar: LE, proximal hip, and core control in self care, mobility, lifting, ambulation and eccentric single leg control. [] UE / cervical: cervical, scapular, scapulothoracic and UE control with self care, reaching, carrying, lifting, house/yardwork, driving, computer work.   [] (51419) Therapist is in constant attendance of 2 or more patients providing skilled therapy interventions, but not providing any significant amount of measurable one-on-one time to either patient, for improvements in  [] LE / lumbar: LE, proximal hip, and core control in self care, mobility, lifting, ambulation and eccentric single leg control. [] UE / cervical: cervical, scapular, scapulothoracic and UE control with self care, reaching, carrying, lifting, house/yardwork, driving, computer work.      NMR and Therapeutic Activities:    [] (34271 or 23053) Provided verbal/tactile cueing for activities related to improving balance, coordination, kinesthetic sense, posture, motor skill, proprioception and motor activation to allow for proper function of   [] LE: / Lumbar core, proximal hip and LE with self care and ADLs  [] UE / Cervical: cervical, postural, scapular, scapulothoracic and UE control with self care, carrying, lifting, driving, computer work.   [] (48600) Gait Re-education- Provided training and instruction to the patient for proper LE, core and proximal hip recruitment and positioning and eccentric body weight control with ambulation re-education including up and down stairs     Home Management Training / Self Care:  [] (41112) Provided self-care/home management training related to activities of daily living and compensatory training, and/or use of adaptive equipment for improvement with: ADLs and compensatory training, meal preparation, safety procedures and instruction in use of adaptive equipment, including bathing, grooming, dressing, personal hygiene, basic household cleaning and chores.      Home Exercise Program:    [] (10334) Reviewed/Progressed HEP activities related to strengthening, flexibility, endurance, ROM of   [] LE / Lumbar: core, proximal hip and LE for functional self-care, mobility, lifting and ambulation/stair navigation   [] UE / Cervical: cervical, postural, scapular, scapulothoracic and UE control with self care, reaching, carrying, lifting, house/yardwork, driving, computer work  [] (79012)Reviewed/Progressed HEP activities related to improving balance, coordination, kinesthetic sense, posture, motor skill, proprioception of   [] LE: core, proximal hip and LE for self care, mobility, lifting, and ambulation/stair navigation    [] UE / Cervical: cervical, postural,  scapular, scapulothoracic and UE control with self care, reaching, carrying, lifting, house/yardwork, driving, computer work    Manual Treatments:  PROM / STM / Oscillations-Mobs:  G-I, II, III, IV (PA's, Inf., Post.)  [] (03631) Provided manual therapy to mobilize LE, proximal hip and/or LS spine soft tissue/joints for the purpose of modulating pain, promoting relaxation,  increasing ROM, reducing/eliminating soft tissue swelling/inflammation/restriction, improving soft tissue extensibility and allowing for proper ROM for normal function with   [] LE / lumbar: self care, mobility, lifting and ambulation. [] UE / Cervical: self care, reaching, carrying, lifting, house/yardwork, driving, computer work. Modalities:  [] (80093) Vasopneumatic compression: Utilized vasopneumatic compression to decrease edema / swelling for the purpose of improving mobility and quad tone / recruitment which will allow for increased overall function including but not limited to self-care, transfers, ambulation, and ascending / descending stairs. Charges:  Timed Code Treatment Minutes: 10   Total Treatment Minutes: 29     [] EVAL - LOW (76363)   [] EVAL - MOD (88592)  [] EVAL - HIGH (75535)  [] RE-EVAL (83819)  [] XX(59224) x       [] Ionto  [] NMR (73044) x       [] Vaso  [] Manual (57768) x       [] Ultrasound  [] TA x        [] Mech Traction (25593)  [x] Aquatic Therapy x  1   [] ES (un) (82175):   [] Home Management Training x  [] ES(attended) (91834)   [] Dry Needling 1-2 muscles (99922):  [] Dry Needling 3+ muscles (077214)  [x] Group: 1     [] Other:     GOALS:   Patient stated goal: To be able to walk and run again      Therapist goals for Patient:  Short Term Goals: To be achieved in: 2 weeks  1. Independent in HEP and progression per patient tolerance, in order to prevent re-injury. [] Progressing: [] Met: [] Not Met: [] Adjusted       2. Patient will have a decrease in pain to facilitate improvement in movement, function, and ADLs to allow decreased pain with walking. [] Progressing: [] Met: [] Not Met: [] Adjusted          Long Term Goals: To be achieved in: 6 weeks  1. FOTO score of at least 78 to assist with reaching prior level of function. [] Progressing: [] Met: [] Not Met: [] Adjusted       2. Patient will demonstrate increased AROM to [facility] to allow for proper joint functioning to allow decreased pain with housework.   [] Progressing: [] Met: [] Not Met: [] Adjusted       3. Patient will demonstrate an increase in postural awareness and control to allow for proper functional mobility with housework, walking, running. [] Progressing: [] Met: [] Not Met: [] Adjusted       4. Patient will demonstrate an increase in Strength to at least 5/5 to allow for proper functional mobility as indicated by patients decreased core control, pain with housework. [] Progressing: [] Met: [] Not Met: [] Adjusted       5. Patient will return to functional activities including walking without increased symptoms or restriction. [] Progressing: [] Met: [] Not Met: [] Adjusted       6. Patient will return to running without increased symptoms or restriction. [] Progressing: [] Met: [] Not Met: [] Adjusted         Overall Progression Towards Functional goals/ Treatment Progress Update:  [] Patient is progressing as expected towards functional goals listed. [] Progression is slowed due to complexities/Impairments listed. [] Progression has been slowed due to co-morbidities. [x] Plan just implemented, too soon to assess goals progression <30days   [] Goals require adjustment due to lack of progress  [] Patient is not progressing as expected and requires additional follow up with physician  [] Other    Persisting Functional Limitations/Impairments:  []Sleeping []Sitting               [x]Standing [x]Transfers        [x]Walking []Kneeling               [x]Stairs [x]Squatting / bending   []ADLs []Reaching  [x]Lifting  [x]Housework  []Driving []Job related tasks  [x]Sports/Recreation []Other:        ASSESSMENT:  Pt progressing well towards goals, can feel a difference in her body and feels that aquatics has really helped her. Continues to further progress exercise intensity.       Treatment/Activity Tolerance:  [x] Patient able to complete tx [] Patient limited by fatigue  [] Patient limited by pain  [] Patient limited by other medical complications  [] Other:     Prognosis: [x] Good [] Fair  [] Poor    Patient Requires Follow-up: [x] Yes  [] No    Plan for next treatment session: Aquatics     PLAN: See eval. PT 2x / week for 6 weeks. [x] Continue per plan of care [] Alter current plan (see comments)  [] Plan of care initiated [] Hold pending MD visit [] Discharge    Electronically signed by: Hung Weller PT, DPT         Note: If patient does not return for scheduled/ recommended follow up visits, this note will serve as a discharge from care along with most recent update on progress.

## 2022-08-25 ENCOUNTER — HOSPITAL ENCOUNTER (OUTPATIENT)
Dept: PHYSICAL THERAPY | Age: 67
Setting detail: THERAPIES SERIES
Discharge: HOME | End: 2022-08-25
Payer: OTHER MISCELLANEOUS

## 2022-08-25 PROCEDURE — 97113 AQUATIC THERAPY/EXERCISES: CPT

## 2022-08-25 PROCEDURE — 97150 GROUP THERAPEUTIC PROCEDURES: CPT

## 2022-08-25 NOTE — FLOWSHEET NOTE
[facility] Therapy  Phone: [phone]   Fax: [phone]    Physical Therapy Daily Treatment Note  Date:  2022    Patient Name:  Veronica Matthews    :  1955  MRN: [id number]  Medical/Treatment Diagnosis Information:  Diagnosis: M79.671, M79.672 (ICD-10-CM) - Foot pain, bilateral  M25.571, M25.572 (ICD-10-CM) - Acute bilateral ankle pain  Treatment Diagnosis: bilateral ankle and UE pain, decreased trunk control, decreased UE strength and ROM  Insurance/Certification information:   PT Insurance Information: farmer's insurance  Physician Information:   Lydia Foreman, [address] [place] [address] signed (Y/N): []  Yes [x]  No -sent on     Date of Patient follow up with Physician:      Progress Report: []  Yes  [x]  No     Date Range for reporting period:  Beginnin/25  Ending:     Progress report due (10 Rx/or 30 days whichever is less): visit #10 or  (date)     Recertification due (POC duration/ or 90 days whichever is less): visit # or 10/25 (date)     Visit # Insurance Allowable Auth required? Date Range   1+   []  Yes  [x]  No      Latex Allergy:  [x]NO      []YES  Preferred Language for Healthcare:   [x]English       []other:    Functional Scale:           Date assessed:  FOTO physical FS primary measure score = 67; risk adjusted = 56      Pain level: 4/10 L lateral hip and proximal thigh       SUBJECTIVE:   Pt reports that she is doing well today and did not experience any increase in symptoms after the last session.     OBJECTIVE: See eval      RESTRICTIONS/PRECAUTIONS: NA    Exercises/Interventions:   Therapeutic Exercises (32115) Resistance / level Sets/sec Reps Notes                                                                  Therapeutic Activities (03486)                                          Neuromuscular Re-ed (90395)                                                 Manual Intervention (80415) DB= Dumbells T= Theratube   H= Hydrotone N= Noodles W= Weights   P= Paddles S= Speedo equipment K= Kickboard      Pt. Education: Gave pt tour of pool, explained how to use lockers, what to wear, that it would be in group setting, and showed pt aquatic equipment. Modalities:     Pt. Education:  -patient educated on diagnosis, prognosis and expectations for rehab  -all patient questions were answered    Home Exercise Program:  HEP instruction: Written HEP instructions provided and reviewed  Access Code: [email or web address]  URL: [email or web address]  Date: 07/25/2022  Prepared by: Herberth Ny     Exercises  Gastroc Stretch on Wall - 1 x daily - 7 x weekly - 1 sets - 10 reps - 30 sec hold  Standing Gastroc Stretch on Step - 1 x daily - 7 x weekly - 1 sets - 10 reps - 30 sec hold  Seated Hamstring Stretch - 1 x daily - 7 x weekly - 1 sets - 10 reps - 30 sec hold  Quadricep Stretch with Chair and Counter Support - 1 x daily - 7 x weekly - 1 sets - 10 reps - 30 sec hold  Seated Wrist Flexion Stretch - 1 x daily - 7 x weekly - 1 sets - 10 reps - 30 sec hold  Seated Wrist Extension Stretch - 1 x daily - 7 x weekly - 1 sets - 10 reps - 30 sec hold    Aquatics   [x] (55607) Aquatic therapy with therapeutic exercise. Provided verbal and tactile cueing for activities related to strengthening, flexibility, endurance, ROM for improvements in  [x] LE / lumbar: LE, proximal hip, and core control in self care, mobility, lifting, ambulation and eccentric single leg control. [] UE / cervical: cervical, scapular, scapulothoracic and UE control with self care, reaching, carrying, lifting, house/yardwork, driving, computer work.    [x] (91553) Therapist is in constant attendance of 2 or more patients providing skilled therapy interventions, but not providing any significant amount of measurable one-on-one time to either patient, for improvements in  [x] LE / lumbar: LE, proximal hip, and core control in self care, mobility, lifting, cervical, postural, scapular, scapulothoracic and UE control with self care, carrying, lifting, driving, computer work.   [] (52701) Gait Re-education- Provided training and instruction to the patient for proper LE, core and proximal hip recruitment and positioning and eccentric body weight control with ambulation re-education including up and down stairs     Home Management Training / Self Care:  [] (67187) Provided self-care/home management training related to activities of daily living and compensatory training, and/or use of adaptive equipment for improvement with: ADLs and compensatory training, meal preparation, safety procedures and instruction in use of adaptive equipment, including bathing, grooming, dressing, personal hygiene, basic household cleaning and chores.      Home Exercise Program:    [] (82838) Reviewed/Progressed HEP activities related to strengthening, flexibility, endurance, ROM of   [] LE / Lumbar: core, proximal hip and LE for functional self-care, mobility, lifting and ambulation/stair navigation   [] UE / Cervical: cervical, postural, scapular, scapulothoracic and UE control with self care, reaching, carrying, lifting, house/yardwork, driving, computer work  [] (49219)Reviewed/Progressed HEP activities related to improving balance, coordination, kinesthetic sense, posture, motor skill, proprioception of   [] LE: core, proximal hip and LE for self care, mobility, lifting, and ambulation/stair navigation    [] UE / Cervical: cervical, postural,  scapular, scapulothoracic and UE control with self care, reaching, carrying, lifting, house/yardwork, driving, computer work    Manual Treatments:  PROM / STM / Oscillations-Mobs:  G-I, II, III, IV (PA's, Inf., Post.)  [] (73901) Provided manual therapy to mobilize LE, proximal hip and/or LS spine soft tissue/joints for the purpose of modulating pain, promoting relaxation,  increasing ROM, reducing/eliminating soft tissue swelling/inflammation/restriction, improving soft tissue extensibility and allowing for proper ROM for normal function with   [] LE / lumbar: self care, mobility, lifting and ambulation. [] UE / Cervical: self care, reaching, carrying, lifting, house/yardwork, driving, computer work. Modalities:  [] (23334) Vasopneumatic compression: Utilized vasopneumatic compression to decrease edema / swelling for the purpose of improving mobility and quad tone / recruitment which will allow for increased overall function including but not limited to self-care, transfers, ambulation, and ascending / descending stairs. Charges:  Timed Code Treatment Minutes: 10   Total Treatment Minutes: 23     [] EVAL - LOW (58484)   [] EVAL - MOD (20901)  [] EVAL - HIGH (87667)  [] RE-EVAL (02517)  [] OW(21894) x       [] Ionto  [] NMR (92909) x       [] Vaso  [] Manual (85944) x       [] Ultrasound  [] TA x        [] Mech Traction (50382)  [x] Aquatic Therapy x  1   [] ES (un) (67877):   [] Home Management Training x  [] ES(attended) (45158)   [] Dry Needling 1-2 muscles (70210):  [] Dry Needling 3+ muscles (423936)  [x] Group: 1     [] Other:     GOALS:   Patient stated goal: To be able to walk and run again      Therapist goals for Patient:  Short Term Goals: To be achieved in: 2 weeks  1. Independent in HEP and progression per patient tolerance, in order to prevent re-injury. [] Progressing: [] Met: [] Not Met: [] Adjusted       2. Patient will have a decrease in pain to facilitate improvement in movement, function, and ADLs to allow decreased pain with walking. [] Progressing: [] Met: [] Not Met: [] Adjusted          Long Term Goals: To be achieved in: 6 weeks  1. FOTO score of at least 78 to assist with reaching prior level of function. [] Progressing: [] Met: [] Not Met: [] Adjusted       2. Patient will demonstrate increased AROM to [facility] to allow for proper joint functioning to allow decreased pain with housework.   [] Progressing: [] Met: [] Not Met: [] Adjusted       3. Patient will demonstrate an increase in postural awareness and control to allow for proper functional mobility with housework, walking, running. [] Progressing: [] Met: [] Not Met: [] Adjusted       4. Patient will demonstrate an increase in Strength to at least 5/5 to allow for proper functional mobility as indicated by patients decreased core control, pain with housework. [] Progressing: [] Met: [] Not Met: [] Adjusted       5. Patient will return to functional activities including walking without increased symptoms or restriction. [] Progressing: [] Met: [] Not Met: [] Adjusted       6. Patient will return to running without increased symptoms or restriction. [] Progressing: [] Met: [] Not Met: [] Adjusted         Overall Progression Towards Functional goals/ Treatment Progress Update:  [] Patient is progressing as expected towards functional goals listed. [] Progression is slowed due to complexities/Impairments listed. [] Progression has been slowed due to co-morbidities. [x] Plan just implemented, too soon to assess goals progression <30days   [] Goals require adjustment due to lack of progress  [] Patient is not progressing as expected and requires additional follow up with physician  [] Other    Persisting Functional Limitations/Impairments:  []Sleeping []Sitting               [x]Standing [x]Transfers        [x]Walking []Kneeling               [x]Stairs [x]Squatting / bending   []ADLs []Reaching  [x]Lifting  [x]Housework  []Driving []Job related tasks  [x]Sports/Recreation []Other:        ASSESSMENT:  Pt reported feeling good following today's session. She still experiences some pain in her L foot which got up to a 5/10 today, she does report that it is getting better.       Treatment/Activity Tolerance:  [x] Patient able to complete tx [] Patient limited by fatigue  [] Patient limited by pain  [] Patient limited by other medical complications  [] Other:     Prognosis: [x] Good [] Fair  [] Poor    Patient Requires Follow-up: [x] Yes  [] No    Plan for next treatment session: Aquatics     PLAN: See eval. PT 2x / week for 6 weeks. [x] Continue per plan of care [] Alter current plan (see comments)  [] Plan of care initiated [] Hold pending MD visit [] Discharge    Electronically signed by: Femi Horton, PT, DPT         Note: If patient does not return for scheduled/ recommended follow up visits, this note will serve as a discharge from care along with most recent update on progress.

## 2022-08-30 ENCOUNTER — HOSPITAL ENCOUNTER (OUTPATIENT)
Dept: PHYSICAL THERAPY | Age: 67
Setting detail: THERAPIES SERIES
Discharge: HOME | End: 2022-08-30
Payer: OTHER MISCELLANEOUS

## 2022-08-30 NOTE — FLOWSHEET NOTE
Physical Therapy  Cancellation/No-show Note  Patient Name:  Merari German  :  1955   Date:  2022  Cancelled visits to date: 1  No-shows to date: 1    Patient status for today's appointment patient:  [x]  Cancelled -  []  Rescheduled appointment  [x]  No-show -     Reason given by patient:  []  Patient ill  []  Conflicting appointment  []  No transportation    []  Conflict with work  [x]  No reason given  []  Other:     Comments:      Phone call information:   []  Phone call made today to patient at _ time at number provided:      []  Patient answered, conversation as follows:    []  Patient did not answer, message left as follows:  [x]  Phone call not made today  []  Phone call not needed - pt contacted us to cancel and provided reason for cancellation.      Electronically signed by:  Dipti Ocampo, PT, DPT

## 2022-09-01 ENCOUNTER — HOSPITAL ENCOUNTER (OUTPATIENT)
Dept: PHYSICAL THERAPY | Age: 67
Setting detail: THERAPIES SERIES
Discharge: HOME | End: 2022-09-01

## 2022-09-01 NOTE — FLOWSHEET NOTE
Physical Therapy  Cancellation/No-show Note  Patient Name:  Ruperto Mora  :  1955   Date:  2022  Cancelled visits to date: 1  No-shows to date: 2    Patient status for today's appointment patient:  [x]  Cancelled -  []  Rescheduled appointment  [x]  No-show -,      Reason given by patient:  []  Patient ill  []  Conflicting appointment  []  No transportation    []  Conflict with work  [x]  No reason given  []  Other:     Comments:      Phone call information:   [x]  Phone call made today to patient at _ time at number provided:      []  Patient answered, conversation as follows:    [x]  Patient did not answer, message left as follows: Informed of missed session, today is pt's last scheduled session, advised pt to call and make land appointment if wanting to continue therapy, if no contact in 90 days will need a new referral.    []  Phone call not made today  []  Phone call not needed - pt contacted us to cancel and provided reason for cancellation.      Electronically signed by:  Mingo Schultz, PT, DPT

## 2023-05-02 ENCOUNTER — TELEPHONE (OUTPATIENT)
Dept: FAMILY MEDICINE CLINIC | Age: 68
End: 2023-05-02

## 2023-05-02 NOTE — TELEPHONE ENCOUNTER
Patient will call back please let patient know that she is due for a mammogram and a colonoscopy. Please ask patient if she has any family history of colon cancer or ever had a positive colonoscopy.      (Needs orders for both if patient agrees)